# Patient Record
Sex: FEMALE | Race: WHITE | NOT HISPANIC OR LATINO | Employment: UNEMPLOYED | URBAN - METROPOLITAN AREA
[De-identification: names, ages, dates, MRNs, and addresses within clinical notes are randomized per-mention and may not be internally consistent; named-entity substitution may affect disease eponyms.]

---
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
## 2018-04-17 ENCOUNTER — GYNECOLOGY VISIT (OUTPATIENT)
Dept: OBGYN | Facility: MEDICAL CENTER | Age: 18
End: 2018-04-17
Payer: COMMERCIAL

## 2018-04-17 VITALS
WEIGHT: 128 LBS | BODY MASS INDEX: 22.68 KG/M2 | DIASTOLIC BLOOD PRESSURE: 58 MMHG | HEIGHT: 63 IN | SYSTOLIC BLOOD PRESSURE: 100 MMHG

## 2018-04-17 DIAGNOSIS — Z30.46 NEXPLANON REMOVAL: ICD-10-CM

## 2018-04-17 LAB
INT CON NEG: NEGATIVE
INT CON POS: POSITIVE
POC URINE PREGNANCY TEST: NEGATIVE

## 2018-04-17 PROCEDURE — 11982 REMOVE DRUG IMPLANT DEVICE: CPT | Performed by: OBSTETRICS & GYNECOLOGY

## 2018-04-17 PROCEDURE — 81025 URINE PREGNANCY TEST: CPT | Performed by: OBSTETRICS & GYNECOLOGY

## 2018-04-17 NOTE — PROGRESS NOTES
PROCEDURE NOTE    NEXPLANON REMOVAL    Informed consent obtained.  Left arm examined, korey palpated in subdermal location.  Area prepped with Betadine x 3.  2 cc of 1% Lidocaine injected under base of korey.  2 mm skin incision made with the scalpel.  Nexplanon korey grasped with curved hemostat and removed, showed to patient, and discarded.  Steri-strip and pressure dressing applied to wound.    F/U for annual exam.

## 2018-06-26 ENCOUNTER — HOSPITAL ENCOUNTER (OUTPATIENT)
Facility: MEDICAL CENTER | Age: 18
End: 2018-06-26
Attending: NURSE PRACTITIONER
Payer: COMMERCIAL

## 2018-06-26 ENCOUNTER — OFFICE VISIT (OUTPATIENT)
Dept: URGENT CARE | Facility: CLINIC | Age: 18
End: 2018-06-26
Payer: COMMERCIAL

## 2018-06-26 VITALS
HEART RATE: 93 BPM | OXYGEN SATURATION: 97 % | DIASTOLIC BLOOD PRESSURE: 78 MMHG | TEMPERATURE: 99.3 F | HEIGHT: 63 IN | SYSTOLIC BLOOD PRESSURE: 122 MMHG | RESPIRATION RATE: 12 BRPM | BODY MASS INDEX: 22.15 KG/M2 | WEIGHT: 125 LBS

## 2018-06-26 DIAGNOSIS — N12 PYELONEPHRITIS: ICD-10-CM

## 2018-06-26 LAB
APPEARANCE UR: NORMAL
BILIRUB UR STRIP-MCNC: NORMAL MG/DL
COLOR UR AUTO: NORMAL
GLUCOSE UR STRIP.AUTO-MCNC: NORMAL MG/DL
KETONES UR STRIP.AUTO-MCNC: 160 MG/DL
LEUKOCYTE ESTERASE UR QL STRIP.AUTO: NORMAL
NITRITE UR QL STRIP.AUTO: NORMAL
PH UR STRIP.AUTO: 6 [PH] (ref 5–8)
PROT UR QL STRIP: 30 MG/DL
RBC UR QL AUTO: NORMAL
SP GR UR STRIP.AUTO: 1.02
UROBILINOGEN UR STRIP-MCNC: NORMAL MG/DL

## 2018-06-26 PROCEDURE — 81002 URINALYSIS NONAUTO W/O SCOPE: CPT | Performed by: NURSE PRACTITIONER

## 2018-06-26 PROCEDURE — 87086 URINE CULTURE/COLONY COUNT: CPT

## 2018-06-26 PROCEDURE — 99214 OFFICE O/P EST MOD 30 MIN: CPT | Performed by: NURSE PRACTITIONER

## 2018-06-26 RX ORDER — CIPROFLOXACIN 500 MG/1
500 TABLET, FILM COATED ORAL EVERY 12 HOURS
Qty: 14 TAB | Refills: 0 | Status: SHIPPED | OUTPATIENT
Start: 2018-06-26 | End: 2018-07-03

## 2018-06-26 ASSESSMENT — ENCOUNTER SYMPTOMS
SORE THROAT: 0
BOWEL INCONTINENCE: 0
BACK PAIN: 1
VOMITING: 0
CHILLS: 0
DIZZINESS: 0
SHORTNESS OF BREATH: 0
MYALGIAS: 0
FEVER: 0
FLANK PAIN: 0
NAUSEA: 0
EYE PAIN: 0

## 2018-06-26 NOTE — PROGRESS NOTES
"Subjective:   Anais Larsen is a 17 y.o. female who presents for Back Pain (lower back, lower abdominal pain both quardrants, vomitting, diarreah x 1 week )        Back Pain   This is a new problem. The current episode started in the past 7 days. The problem occurs constantly. The problem is unchanged. Pain location: right/left flank. The quality of the pain is described as aching. The pain does not radiate. The pain is at a severity of 4/10. The pain is mild. The pain is the same all the time. Exacerbated by: palpation. Associated symptoms include dysuria. Pertinent negatives include no bladder incontinence, bowel incontinence, chest pain or fever. She has tried nothing for the symptoms. The treatment provided no relief.     Review of Systems   Constitutional: Negative for chills and fever.   HENT: Negative for sore throat.    Eyes: Negative for pain.   Respiratory: Negative for shortness of breath.    Cardiovascular: Negative for chest pain.   Gastrointestinal: Negative for bowel incontinence, nausea and vomiting.   Genitourinary: Positive for dysuria. Negative for bladder incontinence, flank pain, frequency, hematuria and urgency.   Musculoskeletal: Positive for back pain. Negative for myalgias.   Skin: Negative for rash.   Neurological: Negative for dizziness.     Allergies   Allergen Reactions   • Penicillin G Rash     rash   • Sulfa Drugs Swelling      Objective:   /78   Pulse 93   Temp 37.4 °C (99.3 °F)   Resp 12   Ht 1.6 m (5' 3\")   Wt 56.7 kg (125 lb)   LMP 06/23/2018   SpO2 97%   BMI 22.14 kg/m²   Physical Exam   Constitutional: She is oriented to person, place, and time. She appears well-developed and well-nourished. No distress.   HENT:   Head: Normocephalic and atraumatic.   Eyes: Conjunctivae and EOM are normal. Pupils are equal, round, and reactive to light.   Cardiovascular: Normal rate and regular rhythm.    No murmur heard.  Pulmonary/Chest: Effort normal and breath sounds normal. " No respiratory distress.   Abdominal: Soft. She exhibits no distension. There is tenderness in the suprapubic area. There is CVA tenderness (bilaterally).   Neurological: She is alert and oriented to person, place, and time. She has normal reflexes. No sensory deficit.   Skin: Skin is warm and dry.   Psychiatric: She has a normal mood and affect.         Assessment/Plan:   Assessment    1. Pyelonephritis  - POCT Urinalysis  - Urine Culture; Future  - ciprofloxacin (CIPRO) 500 MG Tab; Take 1 Tab by mouth every 12 hours for 7 days.  Dispense: 14 Tab; Refill: 0  - cefTRIAXone (ROCEPHIN) 1 g, lidocaine (XYLOCAINE) 1 % 3.6 mL for IM use; 1 g by Intramuscular route Once.    Pt. Was given ABX therapy today and will change therapy if culture indicates this is necessary. ER precautions given- worsening symptoms, back pain, abd. Pain, or fevers.   Pt. Is to increase fluids, and take the complete duration of the therapy.   Pt. Understands and agrees with the plan.   F/U with PCP in 3-4 days as needed.       Differential diagnosis, natural history, supportive care, and indications for immediate follow-up discussed.

## 2018-06-27 DIAGNOSIS — N12 PYELONEPHRITIS: ICD-10-CM

## 2018-06-29 LAB
BACTERIA UR CULT: NORMAL
SIGNIFICANT IND 70042: NORMAL
SITE SITE: NORMAL
SOURCE SOURCE: NORMAL

## 2018-08-03 ENCOUNTER — HOSPITAL ENCOUNTER (OUTPATIENT)
Dept: LAB | Facility: MEDICAL CENTER | Age: 18
End: 2018-08-03
Attending: STUDENT IN AN ORGANIZED HEALTH CARE EDUCATION/TRAINING PROGRAM
Payer: COMMERCIAL

## 2018-08-03 LAB — B-HCG SERPL-ACNC: 8431 MIU/ML (ref 0–5)

## 2018-08-03 PROCEDURE — 84702 CHORIONIC GONADOTROPIN TEST: CPT

## 2018-08-03 PROCEDURE — 36415 COLL VENOUS BLD VENIPUNCTURE: CPT

## 2018-09-06 ENCOUNTER — HOSPITAL ENCOUNTER (EMERGENCY)
Facility: MEDICAL CENTER | Age: 18
End: 2018-09-06
Attending: EMERGENCY MEDICINE
Payer: COMMERCIAL

## 2018-09-06 ENCOUNTER — GYNECOLOGY VISIT (OUTPATIENT)
Dept: OBGYN | Facility: MEDICAL CENTER | Age: 18
End: 2018-09-06
Payer: COMMERCIAL

## 2018-09-06 ENCOUNTER — HOSPITAL ENCOUNTER (OUTPATIENT)
Dept: LAB | Facility: MEDICAL CENTER | Age: 18
End: 2018-09-06
Attending: OBSTETRICS & GYNECOLOGY
Payer: COMMERCIAL

## 2018-09-06 VITALS
WEIGHT: 125 LBS | RESPIRATION RATE: 17 BRPM | BODY MASS INDEX: 22.15 KG/M2 | HEIGHT: 63 IN | SYSTOLIC BLOOD PRESSURE: 124 MMHG | HEART RATE: 60 BPM | DIASTOLIC BLOOD PRESSURE: 69 MMHG | OXYGEN SATURATION: 98 % | TEMPERATURE: 98.8 F

## 2018-09-06 VITALS
HEIGHT: 63 IN | BODY MASS INDEX: 22.15 KG/M2 | DIASTOLIC BLOOD PRESSURE: 72 MMHG | WEIGHT: 125 LBS | SYSTOLIC BLOOD PRESSURE: 103 MMHG

## 2018-09-06 DIAGNOSIS — O03.9 MISCARRIAGE: ICD-10-CM

## 2018-09-06 DIAGNOSIS — R10.9 ABDOMINAL CRAMPING: ICD-10-CM

## 2018-09-06 DIAGNOSIS — N93.8 DUB (DYSFUNCTIONAL UTERINE BLEEDING): ICD-10-CM

## 2018-09-06 DIAGNOSIS — N93.9 VAGINAL BLEEDING: ICD-10-CM

## 2018-09-06 DIAGNOSIS — O02.1 MISSED AB: ICD-10-CM

## 2018-09-06 LAB
ABO GROUP BLD: NORMAL
ALBUMIN SERPL BCP-MCNC: 4 G/DL (ref 3.2–4.9)
ALBUMIN/GLOB SERPL: 1.3 G/DL
ALP SERPL-CCNC: 70 U/L (ref 30–99)
ALT SERPL-CCNC: 31 U/L (ref 2–50)
ANION GAP SERPL CALC-SCNC: 8 MMOL/L (ref 0–11.9)
APTT PPP: 26.4 SEC (ref 24.7–36)
AST SERPL-CCNC: 26 U/L (ref 12–45)
B-HCG SERPL-ACNC: 6894 MIU/ML (ref 0–10)
BASOPHILS # BLD AUTO: 0.8 % (ref 0–1.8)
BASOPHILS # BLD AUTO: 0.8 % (ref 0–1.8)
BASOPHILS # BLD: 0.08 K/UL (ref 0–0.12)
BASOPHILS # BLD: 0.09 K/UL (ref 0–0.12)
BILIRUB SERPL-MCNC: 0.7 MG/DL (ref 0.1–1.5)
BLD GP AB SCN SERPL QL: NORMAL
BUN SERPL-MCNC: 9 MG/DL (ref 8–22)
CALCIUM SERPL-MCNC: 9.5 MG/DL (ref 8.4–10.2)
CHLORIDE SERPL-SCNC: 105 MMOL/L (ref 96–112)
CO2 SERPL-SCNC: 23 MMOL/L (ref 20–33)
CREAT SERPL-MCNC: 0.73 MG/DL (ref 0.5–1.4)
EOSINOPHIL # BLD AUTO: 0.14 K/UL (ref 0–0.51)
EOSINOPHIL # BLD AUTO: 0.22 K/UL (ref 0–0.51)
EOSINOPHIL NFR BLD: 1.3 % (ref 0–6.9)
EOSINOPHIL NFR BLD: 1.8 % (ref 0–6.9)
ERYTHROCYTE [DISTWIDTH] IN BLOOD BY AUTOMATED COUNT: 44.2 FL (ref 35.9–50)
ERYTHROCYTE [DISTWIDTH] IN BLOOD BY AUTOMATED COUNT: 46.5 FL (ref 35.9–50)
GLOBULIN SER CALC-MCNC: 3 G/DL (ref 1.9–3.5)
GLUCOSE SERPL-MCNC: 113 MG/DL (ref 65–99)
HCT VFR BLD AUTO: 37.7 % (ref 37–47)
HCT VFR BLD AUTO: 42.9 % (ref 37–47)
HGB BLD-MCNC: 13 G/DL (ref 12–16)
HGB BLD-MCNC: 14.3 G/DL (ref 12–16)
IMM GRANULOCYTES # BLD AUTO: 0.03 K/UL (ref 0–0.11)
IMM GRANULOCYTES # BLD AUTO: 0.04 K/UL (ref 0–0.11)
IMM GRANULOCYTES NFR BLD AUTO: 0.3 % (ref 0–0.9)
IMM GRANULOCYTES NFR BLD AUTO: 0.3 % (ref 0–0.9)
IN CLINIC OB SCAN: ABNORMAL
INR PPP: 1.23 (ref 0.87–1.13)
LYMPHOCYTES # BLD AUTO: 2.09 K/UL (ref 1–4.8)
LYMPHOCYTES # BLD AUTO: 2.15 K/UL (ref 1–4.8)
LYMPHOCYTES NFR BLD: 17.9 % (ref 22–41)
LYMPHOCYTES NFR BLD: 20.1 % (ref 22–41)
MCH RBC QN AUTO: 31.6 PG (ref 27–33)
MCH RBC QN AUTO: 31.8 PG (ref 27–33)
MCHC RBC AUTO-ENTMCNC: 33.3 G/DL (ref 33.6–35)
MCHC RBC AUTO-ENTMCNC: 34.5 G/DL (ref 33.6–35)
MCV RBC AUTO: 92.2 FL (ref 81.4–97.8)
MCV RBC AUTO: 94.7 FL (ref 81.4–97.8)
MONOCYTES # BLD AUTO: 0.79 K/UL (ref 0–0.85)
MONOCYTES # BLD AUTO: 0.93 K/UL (ref 0–0.85)
MONOCYTES NFR BLD AUTO: 7.6 % (ref 0–13.4)
MONOCYTES NFR BLD AUTO: 7.8 % (ref 0–13.4)
NEUTROPHILS # BLD AUTO: 7.27 K/UL (ref 2–7.15)
NEUTROPHILS # BLD AUTO: 8.56 K/UL (ref 2–7.15)
NEUTROPHILS NFR BLD: 69.9 % (ref 44–72)
NEUTROPHILS NFR BLD: 71.4 % (ref 44–72)
NRBC # BLD AUTO: 0 K/UL
NRBC # BLD AUTO: 0 K/UL
NRBC BLD-RTO: 0 /100 WBC
NRBC BLD-RTO: 0 /100 WBC
NUMBER OF RH DOSES IND 8505RD: NORMAL
PLATELET # BLD AUTO: 232 K/UL (ref 164–446)
PLATELET # BLD AUTO: 247 K/UL (ref 164–446)
PMV BLD AUTO: 10.3 FL (ref 9–12.9)
PMV BLD AUTO: 10.9 FL (ref 9–12.9)
POTASSIUM SERPL-SCNC: 3.2 MMOL/L (ref 3.6–5.5)
PROT SERPL-MCNC: 7 G/DL (ref 6–8.2)
PROTHROMBIN TIME: 15.4 SEC (ref 12–14.6)
RBC # BLD AUTO: 4.09 M/UL (ref 4.2–5.4)
RBC # BLD AUTO: 4.53 M/UL (ref 4.2–5.4)
RH BLD: NORMAL
RH BLD: NORMAL
SODIUM SERPL-SCNC: 136 MMOL/L (ref 135–145)
WBC # BLD AUTO: 10.4 K/UL (ref 4.8–10.8)
WBC # BLD AUTO: 12 K/UL (ref 4.8–10.8)

## 2018-09-06 PROCEDURE — 86850 RBC ANTIBODY SCREEN: CPT

## 2018-09-06 PROCEDURE — 86901 BLOOD TYPING SEROLOGIC RH(D): CPT

## 2018-09-06 PROCEDURE — 99213 OFFICE O/P EST LOW 20 MIN: CPT | Mod: 25 | Performed by: OBSTETRICS & GYNECOLOGY

## 2018-09-06 PROCEDURE — 80053 COMPREHEN METABOLIC PANEL: CPT

## 2018-09-06 PROCEDURE — 84702 CHORIONIC GONADOTROPIN TEST: CPT

## 2018-09-06 PROCEDURE — 99284 EMERGENCY DEPT VISIT MOD MDM: CPT

## 2018-09-06 PROCEDURE — 86901 BLOOD TYPING SEROLOGIC RH(D): CPT | Mod: 91

## 2018-09-06 PROCEDURE — 86900 BLOOD TYPING SEROLOGIC ABO: CPT

## 2018-09-06 PROCEDURE — 76817 TRANSVAGINAL US OBSTETRIC: CPT | Performed by: OBSTETRICS & GYNECOLOGY

## 2018-09-06 PROCEDURE — 36415 COLL VENOUS BLD VENIPUNCTURE: CPT

## 2018-09-06 PROCEDURE — 85025 COMPLETE CBC W/AUTO DIFF WBC: CPT | Mod: 91

## 2018-09-06 PROCEDURE — 85730 THROMBOPLASTIN TIME PARTIAL: CPT

## 2018-09-06 PROCEDURE — 85610 PROTHROMBIN TIME: CPT

## 2018-09-06 PROCEDURE — 85025 COMPLETE CBC W/AUTO DIFF WBC: CPT

## 2018-09-06 ASSESSMENT — PAIN SCALES - WONG BAKER: WONGBAKER_NUMERICALRESPONSE: HURTS A LITTLE MORE

## 2018-09-06 NOTE — PROGRESS NOTES
"CC: Confirmation of Pregnancy    HPI: Pt is a 18 yo  lmp 18 who presents for evaluation of amenorrhea.  She has had no bleeding since her last menses.  A urine pregnancy test was positive.  She denies vaginal spotting or pain.  She notes nausea and vomiting, but it has improved.    ROS: negative for dizziness, SOB, chest pain, palpitations, dysuria, vaginal discharge.    Blood pressure 103/72, height 1.6 m (5' 3\"), weight 56.7 kg (125 lb), last menstrual period 2018, not currently breastfeeding.    GENERAL: Alert, in no apparent distress  PSYCHIATRIC: Appropriate affect, intact insight and judgement.  ABDOMEN: Soft, nontender, nondistended.  No palpable masses. No hepatosplenomegaly.   No rebound or guarding.  No inguinal lymphadenopathy.  BACK: No CVA tenderness  EXTREMITIES: No edema, no calf tenderness.    GENITOURINARY:  Normal external genitalia, no lesions.  Normal urethral meatus, no masses or tenderness.  Normal bladder without fullness or masses.  Vagina well estrogenized, no vaginal discharge or lesions.  Cervix normal length, nontender.  Uterus normal size, shape, and contour, nontender.  Adnexa nontender, no masses.  Normal anus and perineum.      TRANSVAGINAL ULTRASOUND - performed and interpreted by me    Single gestational sac present.  Yolk sac not visualized    Menendez intrauterine pregnancy with CRL measuring 1.76 cm, c/w 8+2/7 weeks EGA, no cardiac activity noted    No fluid in cul de sac.    Ovaries and cervix appear grossly normal. Cervical length = 3.94 cm.        ASSESSMENT/PLAN:  1. DUB visit - Missed .  Discussed options of expectant management, medical management with cytotec, or suction D&C.  She would like to wait and see if it passes.  Discussed bleeding/pain precautions. Rh factor and CBC obtained.    F/U 2 weeks.   "

## 2018-09-07 ENCOUNTER — HOSPITAL ENCOUNTER (EMERGENCY)
Facility: MEDICAL CENTER | Age: 18
End: 2018-09-07
Attending: EMERGENCY MEDICINE
Payer: COMMERCIAL

## 2018-09-07 ENCOUNTER — APPOINTMENT (OUTPATIENT)
Dept: RADIOLOGY | Facility: MEDICAL CENTER | Age: 18
End: 2018-09-07
Attending: EMERGENCY MEDICINE
Payer: COMMERCIAL

## 2018-09-07 VITALS
RESPIRATION RATE: 18 BRPM | TEMPERATURE: 97.4 F | BODY MASS INDEX: 20.83 KG/M2 | DIASTOLIC BLOOD PRESSURE: 48 MMHG | SYSTOLIC BLOOD PRESSURE: 108 MMHG | HEART RATE: 87 BPM | WEIGHT: 125 LBS | OXYGEN SATURATION: 100 % | HEIGHT: 65 IN

## 2018-09-07 DIAGNOSIS — O03.9 MISCARRIAGE: ICD-10-CM

## 2018-09-07 LAB
B-HCG SERPL-ACNC: 3269.3 MIU/ML (ref 0–5)
BASOPHILS # BLD AUTO: 0.4 % (ref 0–1.8)
BASOPHILS # BLD: 0.05 K/UL (ref 0–0.12)
CYTOLOGY REG CYTOL: NORMAL
EOSINOPHIL # BLD AUTO: 0.12 K/UL (ref 0–0.51)
EOSINOPHIL NFR BLD: 0.9 % (ref 0–6.9)
ERYTHROCYTE [DISTWIDTH] IN BLOOD BY AUTOMATED COUNT: 44.6 FL (ref 35.9–50)
ERYTHROCYTE [DISTWIDTH] IN BLOOD BY AUTOMATED COUNT: 47.2 FL (ref 35.9–50)
HCT VFR BLD AUTO: 27.9 % (ref 37–47)
HCT VFR BLD AUTO: 32 % (ref 37–47)
HGB BLD-MCNC: 11.2 G/DL (ref 12–16)
HGB BLD-MCNC: 9.1 G/DL (ref 12–16)
IMM GRANULOCYTES # BLD AUTO: 0.06 K/UL (ref 0–0.11)
IMM GRANULOCYTES NFR BLD AUTO: 0.4 % (ref 0–0.9)
LYMPHOCYTES # BLD AUTO: 1.76 K/UL (ref 1–4.8)
LYMPHOCYTES NFR BLD: 13.1 % (ref 22–41)
MCH RBC QN AUTO: 31.4 PG (ref 27–33)
MCH RBC QN AUTO: 32.4 PG (ref 27–33)
MCHC RBC AUTO-ENTMCNC: 32.6 G/DL (ref 33.6–35)
MCHC RBC AUTO-ENTMCNC: 35 G/DL (ref 33.6–35)
MCV RBC AUTO: 92.5 FL (ref 81.4–97.8)
MCV RBC AUTO: 96.2 FL (ref 81.4–97.8)
MONOCYTES # BLD AUTO: 0.92 K/UL (ref 0–0.85)
MONOCYTES NFR BLD AUTO: 6.9 % (ref 0–13.4)
NEUTROPHILS # BLD AUTO: 10.5 K/UL (ref 2–7.15)
NEUTROPHILS NFR BLD: 78.3 % (ref 44–72)
NRBC # BLD AUTO: 0 K/UL
NRBC BLD-RTO: 0 /100 WBC
NUMBER OF RH DOSES IND 8505RD: NORMAL
PLATELET # BLD AUTO: 207 K/UL (ref 164–446)
PLATELET # BLD AUTO: 224 K/UL (ref 164–446)
PMV BLD AUTO: 10.3 FL (ref 9–12.9)
PMV BLD AUTO: 10.4 FL (ref 9–12.9)
RBC # BLD AUTO: 2.9 M/UL (ref 4.2–5.4)
RBC # BLD AUTO: 3.46 M/UL (ref 4.2–5.4)
RH BLD: NORMAL
WBC # BLD AUTO: 13.4 K/UL (ref 4.8–10.8)
WBC # BLD AUTO: 17 K/UL (ref 4.8–10.8)

## 2018-09-07 PROCEDURE — 76817 TRANSVAGINAL US OBSTETRIC: CPT

## 2018-09-07 PROCEDURE — 700102 HCHG RX REV CODE 250 W/ 637 OVERRIDE(OP): Performed by: OBSTETRICS & GYNECOLOGY

## 2018-09-07 PROCEDURE — 96375 TX/PRO/DX INJ NEW DRUG ADDON: CPT

## 2018-09-07 PROCEDURE — 96361 HYDRATE IV INFUSION ADD-ON: CPT

## 2018-09-07 PROCEDURE — 700105 HCHG RX REV CODE 258: Performed by: OBSTETRICS & GYNECOLOGY

## 2018-09-07 PROCEDURE — 84702 CHORIONIC GONADOTROPIN TEST: CPT

## 2018-09-07 PROCEDURE — 96376 TX/PRO/DX INJ SAME DRUG ADON: CPT

## 2018-09-07 PROCEDURE — 88305 TISSUE EXAM BY PATHOLOGIST: CPT

## 2018-09-07 PROCEDURE — 99285 EMERGENCY DEPT VISIT HI MDM: CPT

## 2018-09-07 PROCEDURE — 700105 HCHG RX REV CODE 258: Performed by: EMERGENCY MEDICINE

## 2018-09-07 PROCEDURE — 96365 THER/PROPH/DIAG IV INF INIT: CPT

## 2018-09-07 PROCEDURE — 59820 CARE OF MISCARRIAGE: CPT

## 2018-09-07 PROCEDURE — A9270 NON-COVERED ITEM OR SERVICE: HCPCS | Performed by: OBSTETRICS & GYNECOLOGY

## 2018-09-07 PROCEDURE — 700111 HCHG RX REV CODE 636 W/ 250 OVERRIDE (IP): Performed by: EMERGENCY MEDICINE

## 2018-09-07 PROCEDURE — 85025 COMPLETE CBC W/AUTO DIFF WBC: CPT

## 2018-09-07 PROCEDURE — 700111 HCHG RX REV CODE 636 W/ 250 OVERRIDE (IP): Performed by: OBSTETRICS & GYNECOLOGY

## 2018-09-07 PROCEDURE — 700101 HCHG RX REV CODE 250: Performed by: OBSTETRICS & GYNECOLOGY

## 2018-09-07 PROCEDURE — A9270 NON-COVERED ITEM OR SERVICE: HCPCS | Performed by: EMERGENCY MEDICINE

## 2018-09-07 PROCEDURE — 700102 HCHG RX REV CODE 250 W/ 637 OVERRIDE(OP): Performed by: EMERGENCY MEDICINE

## 2018-09-07 PROCEDURE — 86901 BLOOD TYPING SEROLOGIC RH(D): CPT

## 2018-09-07 PROCEDURE — 85027 COMPLETE CBC AUTOMATED: CPT

## 2018-09-07 PROCEDURE — 306637 HCHG MISC ORTHO ITEM RC 0274

## 2018-09-07 RX ORDER — IBUPROFEN 600 MG/1
600 TABLET ORAL EVERY 6 HOURS PRN
Qty: 20 TAB | Refills: 0 | Status: SHIPPED | OUTPATIENT
Start: 2018-09-07 | End: 2019-03-09

## 2018-09-07 RX ORDER — SODIUM CHLORIDE 9 MG/ML
500 INJECTION, SOLUTION INTRAVENOUS ONCE
Status: COMPLETED | OUTPATIENT
Start: 2018-09-07 | End: 2018-09-07

## 2018-09-07 RX ORDER — DOXYCYCLINE HYCLATE 100 MG
100 TABLET ORAL 2 TIMES DAILY
Qty: 8 TAB | Refills: 0 | Status: SHIPPED | OUTPATIENT
Start: 2018-09-07 | End: 2018-09-11

## 2018-09-07 RX ORDER — SODIUM CHLORIDE 9 MG/ML
1000 INJECTION, SOLUTION INTRAVENOUS ONCE
Status: COMPLETED | OUTPATIENT
Start: 2018-09-07 | End: 2018-09-07

## 2018-09-07 RX ORDER — ONDANSETRON 2 MG/ML
4 INJECTION INTRAMUSCULAR; INTRAVENOUS ONCE
Status: COMPLETED | OUTPATIENT
Start: 2018-09-07 | End: 2018-09-07

## 2018-09-07 RX ORDER — IBUPROFEN 600 MG/1
600 TABLET ORAL ONCE
Status: COMPLETED | OUTPATIENT
Start: 2018-09-07 | End: 2018-09-07

## 2018-09-07 RX ORDER — MISOPROSTOL 200 UG/1
200 TABLET ORAL ONCE
Status: COMPLETED | OUTPATIENT
Start: 2018-09-07 | End: 2018-09-07

## 2018-09-07 RX ORDER — MORPHINE SULFATE 4 MG/ML
4 INJECTION, SOLUTION INTRAMUSCULAR; INTRAVENOUS ONCE
Status: COMPLETED | OUTPATIENT
Start: 2018-09-07 | End: 2018-09-07

## 2018-09-07 RX ORDER — ONDANSETRON 4 MG/1
4 TABLET, ORALLY DISINTEGRATING ORAL EVERY 8 HOURS PRN
Qty: 10 TAB | Refills: 0 | Status: SHIPPED | OUTPATIENT
Start: 2018-09-07 | End: 2019-03-09

## 2018-09-07 RX ORDER — HYDROCODONE BITARTRATE AND ACETAMINOPHEN 5; 325 MG/1; MG/1
1-2 TABLET ORAL EVERY 4 HOURS PRN
Qty: 20 TAB | Refills: 0 | Status: SHIPPED | OUTPATIENT
Start: 2018-09-07 | End: 2018-09-10

## 2018-09-07 RX ADMIN — SODIUM CHLORIDE 1000 ML: 9 INJECTION, SOLUTION INTRAVENOUS at 10:16

## 2018-09-07 RX ADMIN — FENTANYL CITRATE 50 MCG: 50 INJECTION INTRAMUSCULAR; INTRAVENOUS at 09:24

## 2018-09-07 RX ADMIN — DOXYCYCLINE 100 MG: 100 INJECTION, POWDER, LYOPHILIZED, FOR SOLUTION INTRAVENOUS at 08:10

## 2018-09-07 RX ADMIN — SODIUM CHLORIDE 500 ML: 9 INJECTION, SOLUTION INTRAVENOUS at 07:01

## 2018-09-07 RX ADMIN — FENTANYL CITRATE 100 MCG: 50 INJECTION INTRAMUSCULAR; INTRAVENOUS at 04:44

## 2018-09-07 RX ADMIN — IBUPROFEN 600 MG: 600 TABLET, FILM COATED ORAL at 10:15

## 2018-09-07 RX ADMIN — ONDANSETRON 4 MG: 2 INJECTION INTRAMUSCULAR; INTRAVENOUS at 04:43

## 2018-09-07 RX ADMIN — ONDANSETRON 4 MG: 2 INJECTION INTRAMUSCULAR; INTRAVENOUS at 07:01

## 2018-09-07 RX ADMIN — FENTANYL CITRATE 50 MCG: 50 INJECTION, SOLUTION INTRAMUSCULAR; INTRAVENOUS at 05:50

## 2018-09-07 RX ADMIN — MISOPROSTOL 200 MCG: 200 TABLET ORAL at 08:20

## 2018-09-07 RX ADMIN — ONDANSETRON 4 MG: 2 INJECTION INTRAMUSCULAR; INTRAVENOUS at 09:23

## 2018-09-07 RX ADMIN — SODIUM CHLORIDE 1000 ML: 9 INJECTION, SOLUTION INTRAVENOUS at 04:41

## 2018-09-07 RX ADMIN — SODIUM CHLORIDE 1000 ML: 9 INJECTION, SOLUTION INTRAVENOUS at 09:25

## 2018-09-07 ASSESSMENT — PAIN SCALES - GENERAL
PAINLEVEL_OUTOF10: 8
PAINLEVEL_OUTOF10: 3

## 2018-09-07 NOTE — OP REPORT
Operative report:    Preoperative diagnosis: Incomplete  at approximately 8 weeks gestation    Postoperative diagnosis: Same      Procedure: Bedside suction curettage      Surgeon: Yury Huertas MD    Anesthesia/pain medication: IV fentanyl    Complications: None    Specimen: Products of conception      Estimated blood loss: Approximately 100 mL of organized clots and blood      Findings: Uterus approximately 9-10 weeks size anteverted mobile and moderately tender.  Cervix was noted to be dilated at about 1-1/2 cm and evaluation there was moderate amount of blood and clots in the vaginal vault.  Active bleeding was noted. there was moderate products of conception on suction        Procedure:  After informed consents were obtained, patient was placed in lithotomy position.  Sterile speculum was placed and cervix was visualized after removal of clots from the vagina.  Anterior lip of the cervix was grasped with a ring forcep and 7 Croatian curved curette was advanced through the cervix to the uterine fundus.  The suction device was activated and the curette was rotated to clear the uterus of the remaining products of conception.  The curette was removed and reinserted and suction was again reactivated with scant amount of clots obtained.  Curette was removed.  No active bleeding was noted.  A ring forcep was removed from the anterior lip of the cervix.  Patient tolerated procedure well.  All sponge and instrument counts are correct ×3.  Patient was given IV doxycycline 100 mg and 1 dose of sublingual Cytotec 200 mcg.  Repeat CBC was ordered.  Precautions and plan of care were reviewed.  Restrictions were discussed.  Patient to follow-up in 1 week in office.

## 2018-09-07 NOTE — ED NOTES
"Patient tolerating PO fluids well, denies pain or nausea at this time.  States \"I'm feeling much better\", denies cramping or vaginal bleeding, VSS .   "

## 2018-09-07 NOTE — ED NOTES
Chief Complaint   Patient presents with   • Vaginal Bleeding     Beginning approx 30 mins ago. Went to OBGYN and was told she had miscarrage.    • Abdominal Cramping     in waves.    Pt was approx 8 weeks pregnant.

## 2018-09-07 NOTE — ED NOTES
Bedside report to EDE Krueger.  Pt cleaned up and provided new gown and blankets.  Large clot collected and pending ERP eval.  Pt states cramping is not as frequent as PTA.

## 2018-09-07 NOTE — ED NOTES
Pt bleeding, pads and warm washcloth provided.  Pt reports pain coming in waves.  PIV placed in RAC, blood drawn and sent to lab.

## 2018-09-07 NOTE — ED PROVIDER NOTES
ED Provider Note    CHIEF COMPLAINT   Chief Complaint   Patient presents with   • Vaginal Bleeding     Beginning approx 30 mins ago. Went to OBGYN and was told she had miscarrage.    • Abdominal Cramping     in waves.        BALDEMAR Larsen is a 17 y.o. female who presents with abdominal cramping and vaginal bleeding.  She was seen by her gynecologist today, bedside ultrasound revealed spontaneous .  At the time, patient offered Cytotec or D and E according to the note, the patient declined.  2 hours after getting home she developed abdominal cramping and bleeding.  Patient presents to our emergency department with ongoing bleeding and cramping.  No loss of consciousness.  No shortness of breath or dizziness.  She denies fever.  She has not noticed passage of tissue.  Rh factor positive.  She denies trauma.  Abdominal cramping in the lower abdomen has improved in the past 60 minutes    REVIEW OF SYSTEMS  Genitourinary vaginal bleeding, pelvic cramping, miscarriage diagnosed today  Constitutional: No fever, no dizziness  Gastrointestinal: Lower abdominal cramping, improving  Musculoskeletal no acute back pain       All other systems are negative.        PAST MEDICAL HISTORY  History reviewed. No pertinent past medical history.    FAMILY HISTORY  Family History   Problem Relation Age of Onset   • Hypertension Father    • Hyperlipidemia Father    • Heart Disease Paternal Grandfather    • Cancer Other         breast Ca       SOCIAL HISTORY  Social History     Social History   • Marital status: Single     Spouse name: N/A   • Number of children: N/A   • Years of education: N/A     Social History Main Topics   • Smoking status: Never Smoker   • Smokeless tobacco: Never Used   • Alcohol use No      Comment: occ   • Drug use: Yes     Types: Inhaled      Comment: marijuana   • Sexual activity: Yes     Partners: Male      Comment: contraception      Other Topics Concern   • Not on file     Social History  "Narrative   • No narrative on file       SURGICAL HISTORY  History reviewed. No pertinent surgical history.    CURRENT MEDICATIONS  No current facility-administered medications on file prior to encounter.      No current outpatient prescriptions on file prior to encounter.       ALLERGIES  Allergies   Allergen Reactions   • Penicillin G Rash     rash   • Sulfa Drugs Swelling       PHYSICAL EXAM  VITAL SIGNS: /69   Pulse 61   Temp 37.1 °C (98.8 °F)   Resp 17   Ht 1.6 m (5' 3\")   Wt 56.7 kg (125 lb)   LMP 06/23/2018   SpO2 97%   BMI 22.14 kg/m²   Constitutional:  Well-nourished, no distress  HENT:  Atraumatic, Bilateral external ears normal, Oropharynx moist  Eyes:  Conjunctiva normal, No discharge.   Cardiovascular: Normal heart rate, Normal rhythm   Pulmonary: Normal breath sounds, No respiratory distress  Abdomen: Soft, nontender, no distention.  Genitourinary: Deferred  Skin: Warm, Dry, No erythema, No rash.   Musculoskeletal: No tenderness, No CVA tenderness.   Neurologic: Strength is symmetric, intact    RADIOLOGY/PROCEDURES  Results for orders placed or performed during the hospital encounter of 09/06/18   CBC WITH DIFFERENTIAL   Result Value Ref Range    WBC 12.0 (H) 4.8 - 10.8 K/uL    RBC 4.09 (L) 4.20 - 5.40 M/uL    Hemoglobin 13.0 12.0 - 16.0 g/dL    Hematocrit 37.7 37.0 - 47.0 %    MCV 92.2 81.4 - 97.8 fL    MCH 31.8 27.0 - 33.0 pg    MCHC 34.5 33.6 - 35.0 g/dL    RDW 44.2 35.9 - 50.0 fL    Platelet Count 232 164 - 446 K/uL    MPV 10.3 9.0 - 12.9 fL    Neutrophils-Polys 71.40 44.00 - 72.00 %    Lymphocytes 17.90 (L) 22.00 - 41.00 %    Monocytes 7.80 0.00 - 13.40 %    Eosinophils 1.80 0.00 - 6.90 %    Basophils 0.80 0.00 - 1.80 %    Immature Granulocytes 0.30 0.00 - 0.90 %    Nucleated RBC 0.00 /100 WBC    Neutrophils (Absolute) 8.56 (H) 2.00 - 7.15 K/uL    Lymphs (Absolute) 2.15 1.00 - 4.80 K/uL    Monos (Absolute) 0.93 (H) 0.00 - 0.85 K/uL    Eos (Absolute) 0.22 0.00 - 0.51 K/uL    Baso " (Absolute) 0.09 0.00 - 0.12 K/uL    Immature Granulocytes (abs) 0.04 0.00 - 0.11 K/uL    NRBC (Absolute) 0.00 K/uL   COMP METABOLIC PANEL   Result Value Ref Range    Sodium 136 135 - 145 mmol/L    Potassium 3.2 (L) 3.6 - 5.5 mmol/L    Chloride 105 96 - 112 mmol/L    Co2 23 20 - 33 mmol/L    Anion Gap 8.0 0.0 - 11.9    Glucose 113 (H) 65 - 99 mg/dL    Bun 9 8 - 22 mg/dL    Creatinine 0.73 0.50 - 1.40 mg/dL    Calcium 9.5 8.4 - 10.2 mg/dL    AST(SGOT) 26 12 - 45 U/L    ALT(SGPT) 31 2 - 50 U/L    Alkaline Phosphatase 70 30 - 99 U/L    Total Bilirubin 0.7 0.1 - 1.5 mg/dL    Albumin 4.0 3.2 - 4.9 g/dL    Total Protein 7.0 6.0 - 8.2 g/dL    Globulin 3.0 1.9 - 3.5 g/dL    A-G Ratio 1.3 g/dL   PROTHROMBIN TIME   Result Value Ref Range    PT 15.4 (H) 12.0 - 14.6 sec    INR 1.23 (H) 0.87 - 1.13   APTT   Result Value Ref Range    APTT 26.4 24.7 - 36.0 sec   RH TYPE FOR RHOGAM FROM E.D.   Result Value Ref Range    Emergency Department Rh Typing POS     Number Of Rh Doses Indicated ZERO    HCG QUANTITATIVE   Result Value Ref Range    Bhcg 6894.0 (H) 0.0 - 10.0 mIU/mL   ESTIMATED GFR   Result Value Ref Range    GFR If African American >60 >60 mL/min/1.73 m 2    GFR If Non African American >60 >60 mL/min/1.73 m 2         COURSE & MEDICAL DECISION MAKING  Pertinent Labs & Imaging studies reviewed. (See chart for details)  Patient with hemoglobin of 13, this is down 1 point from the morning.  Her vital signs are normal.  Patient observed in the emergency department for several hours, during this time bleeding began to improve, abdominal cramping resolved.  Patient stated she felt much better.  Patient is going through spontaneous , I do not find a cause for emergent intervention at this time.  Patient is advised that gynecologist comes to this hospital and that if she were to worsen she should call her gynecologist and/or go to [facility] for reevaluation.  Patient is stable upon discharge    FINAL  IMPRESSION  1. Miscarriage    2. Abdominal cramping    3. Vaginal bleeding            Electronically signed by: Solomon Saldivar, 9/6/2018 8:44 PM

## 2018-09-07 NOTE — ED NOTES
Patient ambulated to bathroom, steady gait, provided pad and wipes and cleaned without assist.  Tolerated well, feels better, MD notified.

## 2018-09-07 NOTE — ED NOTES
Patient cleaned, moderate amount of blood noted on chucks pad, given mesh underwear and pad and warm blankets. Vitals signs remain stable and updated with plan of care.  Waiting CBC results.

## 2018-09-07 NOTE — ED NOTES
Pt c/o severe nausea at this time. Pt attempting to hyperventilate. Instructed pt to slow breathing down. Pt is pale. Will notify ERP.

## 2018-09-07 NOTE — ED NOTES
Patient ambulated around pod, tolerated well, steady gait, vital signs stable, ok for discharge waiting for family to bring pants.

## 2018-09-07 NOTE — ED NOTES
Report received from Cyndy MERA, assumed patient care, patient resting comfortably, vss at this time, repeat H&H due at 0800, will continue to monitor.

## 2018-09-07 NOTE — ED NOTES
"Patient assisted to bedside commode, passed 2 large clots, patient states \"feeling lightheaded and dizzy\", returned to bed with no incident, vital signs rechecked, BP low, IVF initiated, Dr. Butts notified, will come see patient.  "

## 2018-09-07 NOTE — ED PROVIDER NOTES
ED Provider Note    The patient's care was signed over to me at 6 AM.  She was scheduled to have a repeat CBC at 8 AM.  She had presented for evaluation of a miscarriage.  Please see Dr. Chapman's note for details.  GYN had been consulted and had done a suction curettage.  Upon repeat evaluation her hemoglobin has gone from 11-9.1.  I discussed the case with Dr. Hardin who is now on call for GYN.  We went over the case which she was already familiar with.  The patient was having a lot of crampy discomfort still but her bleeding was essentially stopped.  She was somewhat lightheaded with getting up therefore she is given another liter of fluid and treated with Motrin p.o. as well as Zofran.  Upon repeat evaluation the patient states she is greatly improved.  She is not nauseated.  Her pain is greatly subsided.  She is no longer lightheaded.  Her blood pressure is improved.  At this point I think she is fine for discharge home.  I will provide her a prescription for Zofran and Norco.  I reviewed her prescription monitoring report and she will sign a consent for treatment with narcotics.  She already had a prescription waiting for her for Motrin and antibiotics.

## 2018-09-07 NOTE — ED TRIAGE NOTES
Anais Larsen  17 y.o.  female  Chief Complaint   Patient presents with   • Vaginal Bleeding   • Near Syncopal     Present to triage c/o vaginal bleeding since 6 pm. Patient was seen at [facility] earlier for the same. LMP - 2nd week of June. + near syncope. Bleeding and abdominal cramping continues.

## 2018-09-07 NOTE — ED NOTES
Patient c/o increased cramping and pain with vomiting. Dr. Butts at bedside and reevaluated patient.  Orders received and medicated as per order. Call placed to Gyn.

## 2018-09-07 NOTE — ED PROVIDER NOTES
"ED Provider Note    CHIEF COMPLAINT  Chief Complaint   Patient presents with   • Vaginal Bleeding   • Near Syncopal       HPI  Anais Larsen is a 17 y.o. female who presents with vaginal bleeding.  The patient followed up with a gynecologist yesterday for her current pregnancy.  She is found to be approximately 8 weeks and 2 days pregnant but there is no fetal heart tones.  Therefore she was diagnosed with a fetal demise and opted to let this pass naturally.  Subsequently she started having crampy abdominal pain followed by vaginal bleeding.  She went to [facility] emergency department and was hydrated and discharged.  The patient states since that time she has had increased bleeding as well as pain.  She also had a near syncopal episode while standing.  She continues to have heavy bleeding as well as severe cramping suprapubic abdominal pain.  She does not have any nausea or vomiting.  She has not had any difficulty with urination.    REVIEW OF SYSTEMS  See HPI for further details. All other systems are negative.     PAST MEDICAL HISTORY  History reviewed. No pertinent past medical history.    SOCIAL HISTORY  Social History     Social History   • Marital status: Single     Spouse name: N/A   • Number of children: N/A   • Years of education: N/A     Social History Main Topics   • Smoking status: Never Smoker   • Smokeless tobacco: Never Used   • Alcohol use No      Comment: occ   • Drug use: Yes     Types: Inhaled      Comment: marijuana   • Sexual activity: Yes     Partners: Male      Comment: contraception      Other Topics Concern   • Not on file     Social History Narrative   • No narrative on file           PHYSICAL EXAM  VITAL SIGNS: /48   Pulse 82   Temp 36.3 °C (97.4 °F)   Resp (!) 22   Ht 1.651 m (5' 5\")   Wt 56.7 kg (125 lb)   LMP 06/23/2018   SpO2 100%   BMI 20.80 kg/m²   Constitutional: in acute distress, Non-toxic appearance.   HENT: Normocephalic, Atraumatic, tympanic membranes are " intact and nonerythematous bilaterally, Oropharynx moist without exudates or erythema, Nose normal.   Eyes: PERRLA, EOMI, Conjunctiva normal.  Neck: Supple without meningismus  Lymphatic: No lymphadenopathy noted.   Cardiovascular: Normal heart rate, Normal rhythm, No murmurs, No rubs, No gallops.   Thorax & Lungs: Normal breath sounds, No respiratory distress, No wheezing, No chest tenderness.   Abdomen: Suprapubic discomfort to deep palpation, no rebound, no guarding, normal bowel sounds  Skin: Warm, Dry, No erythema, No rash.   Back: No tenderness, No CVA tenderness.   Genitalia: Large amount of blood and clots in the vault.  This was evacuated.  The cervix is open    Extremities: Atraumatic with symmetric distal pulses, No edema, No tenderness, No cyanosis, No clubbing.   Neurologic: Alert & oriented x 3, cranial nerves II through XII are intact, Normal motor function, Normal sensory function, No focal deficits noted.   Psychiatric: Affect normal, Judgment normal, Mood normal.     COURSE & MEDICAL DECISION MAKING  Pertinent Labs & Imaging studies reviewed. (See chart for details)  This a 17-year-old female who presents the emergency department with a miscarriage.  She did have a near syncopal episode prior to arrival and therefore she was hydrated intravenously.  She also received fentanyl for pain control.  I was able to remove a large amount of clot from the vault but no obvious products of conception.  I contacted the gynecologist and he did come into the emergency department and performed suction and cleared out debris and clot from the endocervical canal as well as the vaginal vault.  The patient has had a slight drop in her hemoglobin hematocrit however blood pressure has been stable in the low 100s and she is not tachycardic.  The patient's Rh factor is positive.  The obstetrician gynecologist is going to administer sublingual tocolytics to help the uterus contract to stop the bleeding.  He wants to have  the patient observed for 2 hours with a repeat hemoglobin hematocrit at 8 AM.  If the patient is stable she will be discharged home on doxycycline as per the gynecologist.  The patient will be signed out to my partner for follow-up exam at 8 AM.  At 6 AM the patient is resting much more comfortably and is currently medically stable.    FINAL IMPRESSION  1.  Miscarriage     Electronically signed by: Nash Chapman, 9/7/2018 4:38 AM

## 2018-09-07 NOTE — ED NOTES
Patient discharged home, instructions given with prescriptions, verbalized understanding.  Patient wheeled out of ED as patient is visiting family that is admitted in hospital.   Patient has follow up and verbalized understanding of when to return to ED.

## 2018-09-07 NOTE — ED NOTES
Pt's friend came up to the desk and notified staff that pt was starting to get SOB and feels like they are going to pass out. Notified them that the room is ready and will take them back.

## 2018-09-07 NOTE — ED NOTES
Pt to room 8 via w/c. Agree with triage note. Pt c/o increased vaginal bleeding/ cramping since 1800 last night. Pt reports she was diagnosed with a miscarriage, approx 8 weeks pregnant. Pt reports using 6 pads since 1800.

## 2018-09-07 NOTE — DISCHARGE INSTRUCTIONS
"Miscarriage  A miscarriage is the sudden loss of an unborn baby (fetus) before the 20th week of pregnancy. Most miscarriages happen in the first 3 months of pregnancy. Sometimes, it happens before a woman even knows she is pregnant. A miscarriage is also called a \"spontaneous miscarriage\" or \"early pregnancy loss.\" Having a miscarriage can be an emotional experience. Talk with your caregiver about any questions you may have about miscarrying, the grieving process, and your future pregnancy plans.  What are the causes?  · Problems with the fetal chromosomes that make it impossible for the baby to develop normally. Problems with the baby's genes or chromosomes are most often the result of errors that occur, by chance, as the embryo divides and grows. The problems are not inherited from the parents.  · Infection of the cervix or uterus.  · Hormone problems.  · Problems with the cervix, such as having an incompetent cervix. This is when the tissue in the cervix is not strong enough to hold the pregnancy.  · Problems with the uterus, such as an abnormally shaped uterus, uterine fibroids, or congenital abnormalities.  · Certain medical conditions.  · Smoking, drinking alcohol, or taking illegal drugs.  · Trauma.  Often, the cause of a miscarriage is unknown.  What are the signs or symptoms?  · Vaginal bleeding or spotting, with or without cramps or pain.  · Pain or cramping in the abdomen or lower back.  · Passing fluid, tissue, or blood clots from the vagina.  How is this diagnosed?  Your caregiver will perform a physical exam. You may also have an ultrasound to confirm the miscarriage. Blood or urine tests may also be ordered.  How is this treated?  · Sometimes, treatment is not necessary if you naturally pass all the fetal tissue that was in the uterus. If some of the fetus or placenta remains in the body (incomplete miscarriage), tissue left behind may become infected and must be removed. Usually, a dilation and " curettage (D and C) procedure is performed. During a D and C procedure, the cervix is widened (dilated) and any remaining fetal or placental tissue is gently removed from the uterus.  · Antibiotic medicines are prescribed if there is an infection. Other medicines may be given to reduce the size of the uterus (contract) if there is a lot of bleeding.  · If you have Rh negative blood and your baby was Rh positive, you will need a Rh immunoglobulin shot. This shot will protect any future baby from having Rh blood problems in future pregnancies.  Follow these instructions at home:  · Your caregiver may order bed rest or may allow you to continue light activity. Resume activity as directed by your caregiver.  · Have someone help with home and family responsibilities during this time.  · Keep track of the number of sanitary pads you use each day and how soaked (saturated) they are. Write down this information.  · Do not use tampons. Do not douche or have sexual intercourse until approved by your caregiver.  · Only take over-the-counter or prescription medicines for pain or discomfort as directed by your caregiver.  · Do not take aspirin. Aspirin can cause bleeding.  · Keep all follow-up appointments with your caregiver.  · If you or your partner have problems with grieving, talk to your caregiver or seek counseling to help cope with the pregnancy loss. Allow enough time to grieve before trying to get pregnant again.  Get help right away if:  · You have severe cramps or pain in your back or abdomen.  · You have a fever.  · You pass large blood clots (walnut-sized or larger) or tissue from your vagina. Save any tissue for your caregiver to inspect.  · Your bleeding increases.  · You have a thick, bad-smelling vaginal discharge.  · You become lightheaded, weak, or you faint.  · You have chills.  This information is not intended to replace advice given to you by your health care provider. Make sure you discuss any questions  you have with your health care provider.  Document Released: 06/13/2002 Document Revised: 05/25/2017 Document Reviewed: 02/05/2013  Elsevier Interactive Patient Education © 2017 [employer]

## 2018-09-07 NOTE — H&P
"  OBGYN Consult History and Physical    Anais Diana Larsen is a 17 y.o. female  -Para:     Gestational Age:  ~ 8 wks      HPI: Patient is a 17-year-old  1 para 0 who was seen a couple days ago in the office and was diagnosed with a missed  at 8 weeks and 2 days gestation by ultrasound.  Patient was given options and she had desires expectant management.  Patient presented to the emergency room with heavy bleeding and cramping and pain and was diagnosed with an incomplete  and I was consulted by the ER physician.  At the time of evaluation, patient was comfortable.  She was given pain medication in the ER and reports continuation of bleeding.  I discussed ultrasound findings consistent with incomplete  and I discussed treatment options.  Patient desires to proceed with the option of bedside suction curettage.  I explained patient the procedure, risks, benefits and potential complications.  Patient desires to proceed after explanation.  Informed consents were obtained.    Patient Active Problem List    Diagnosis Date Noted   • Retropharyngeal abscess 2017   • Oral abscess 2017           History:   has no past medical history on file.     has no past surgical history on file.    OB History    Para Term  AB Living   2 0 0 0 0 0   SAB TAB Ectopic Molar Multiple Live Births   0 0 0   0        # Outcome Date GA Lbr Dave/2nd Weight Sex Delivery Anes PTL Lv   2 Current            1                    Medications:  No current facility-administered medications on file prior to encounter.      No current outpatient prescriptions on file prior to encounter.       Allergies:  Penicillin g and Sulfa drugs    ROS:   Neuro: negative    Cardiovascular: negative  Gastro intestinal: positive for cramping  Genitourinary: positive for bleeding/cramping            Physical Exam:  /48   Pulse 82   Temp 36.3 °C (97.4 °F)   Resp (!) 22   Ht 1.651 m (5' 5\")  "  Wt 56.7 kg (125 lb)   LMP 06/23/2018   SpO2 100%   BMI 20.80 kg/m²   Constitutional: healthy-appearing  No JVD: while supine  HEENT: PERRLA  Breast Exam: negative  Cardio: regular rate and rhythm, S1, S2 normal, no murmur, click, rub or gallop  Lung: unlabored respirations, no intercostal retractions or accessory muscle use  Abdomen: abdomen is soft, ND, +BS, MOderate TTP in suprapubic area. no organomegaly or guarding  Extremity: extremities, peripheral pulses and reflexes normal    Pelvic/Cervical Exam: Normal external genitalia urethra and [place]'s glands.  Clots noted at the vaginal introitus.  Moderate amount of blood clots present in the vaginal vault on evaluation.  Clots were removed with ring forceps.  Cervix is visualized and appears dilated.  There was active bleeding noted.    Labs:  Recent Labs      09/06/18   1140  09/06/18   1812  09/07/18   0355   WBC  10.4  12.0*  13.4*   RBC  4.53  4.09*  3.46*   HEMOGLOBIN  14.3  13.0  11.2*   HEMATOCRIT  42.9  37.7  32.0*   MCV  94.7  92.2  92.5   MCH  31.6  31.8  32.4   MCHC  33.3*  34.5  35.0   RDW  46.5  44.2  44.6   PLATELETCT  247  232  224   MPV  10.9  10.3  10.4     Prenatal Results    The patient does not have an associated pregnancy episode and working [employer]. Some results will not display without a pregnancy episode and working [employer].   1st Trimester     Test Value Date Time    ABO O  09/06/18 1144    RH       Antibody       CBC/PLT/DIFF       HGB       Platelets       HGB A1C        1 Hr GCT       3 Hr GTT       Rubella       RPR       Urine Culture       24 Urine Protein        24 Urine Creatinine        HBsAg       Hep CAB        HIV       Gonorrhea       Chlamydia       TSH        Free T4         TB       Pap       SYPHILUS TREP QUAL [id number] [[id number]][             2nd Trimester     Test Value Date Time    HCT       HGB       1 Hr GCT       3 Hr GTT             24-28 Weeks     Test Value Date Time    1 Hr GCT        TSH        Free T4        24 Urine  Protein       24 Urine Creatinine       BUN       Creatinine       GFR       AST       ALT       Uric Acid       LDH             3rd Trimester     Test Value Date Time    HCT       HGB       TSH       Free T4       24 Hr Urine Protein       24 Hr Urine Creatinine       SYPHILUS TREP QUAL             35-37 Weeks     Test Value Date Time    GBS PCR LB       GBS PCR             Genetic Screening     Test Value Date Time    Cystic Fibrosis       AFP Quad       Sickle Cell                   Pelvic US:  HISTORY/REASON FOR EXAM:  Vaginal bleeding    TECHNIQUE/EXAM DESCRIPTION: Real-time OB transvaginal pelvis ultrasound was performed with grey-scale, color and duplex Doppler imaging.    COMPARISON:  None.    FINDINGS:    Uterus measures 8.6 x 5.0 x 4.9 cm. Heterogeneous material is seen in the endometrial canal of the lower uterine segment. Areas of increased Doppler flow within this complex area are seen.    The right ovary measures 3.8 x 1.6 x 1.7 cm. The left ovary measures 4.0 x 2.1 x 1.8 cm. There is 1.6 x 1.1 x 1.1 cm heterogeneous lesion in the right ovary. Doppler flow in the bilateral ovaries is seen.   Impression         1.  No intrauterine pregnancy identified.  2.  Thickened endometrial stripe with complex heterogeneous area in the lower uterine segment, appearance suggests retained products of conception.  3.  Complex right ovarian lesion, appearance suggests corpus luteal cyst         Assessment:  Incomplete  at approximately 8 weeks gestation  Rh+ status  Patient was counseled on treatment options and desires to proceed with bedside suction curettage    Patient Active Problem List    Diagnosis Date Noted   • Retropharyngeal abscess 2017   • Oral abscess 2017       Plan:   We will proceed with bedside suction curettage  Informed consents obtained.    Yury Huertas M.D.

## 2018-09-13 ENCOUNTER — GYNECOLOGY VISIT (OUTPATIENT)
Dept: OBGYN | Facility: CLINIC | Age: 18
End: 2018-09-13
Payer: COMMERCIAL

## 2018-09-13 VITALS
WEIGHT: 126 LBS | HEIGHT: 65 IN | DIASTOLIC BLOOD PRESSURE: 64 MMHG | BODY MASS INDEX: 20.99 KG/M2 | SYSTOLIC BLOOD PRESSURE: 108 MMHG

## 2018-09-13 DIAGNOSIS — O03.4 INCOMPLETE ABORTION: ICD-10-CM

## 2018-09-13 DIAGNOSIS — D50.9 IRON DEFICIENCY ANEMIA, UNSPECIFIED IRON DEFICIENCY ANEMIA TYPE: ICD-10-CM

## 2018-09-13 DIAGNOSIS — Z30.011 ENCOUNTER FOR INITIAL PRESCRIPTION OF CONTRACEPTIVE PILLS: ICD-10-CM

## 2018-09-13 DIAGNOSIS — Z98.890 S/P DILATATION AND CURETTAGE: ICD-10-CM

## 2018-09-13 PROCEDURE — 99213 OFFICE O/P EST LOW 20 MIN: CPT | Mod: 24 | Performed by: OBSTETRICS & GYNECOLOGY

## 2018-09-13 RX ORDER — NORGESTIMATE AND ETHINYL ESTRADIOL 7DAYSX3 LO
1 KIT ORAL DAILY
Qty: 28 TAB | Refills: 11 | Status: SHIPPED | OUTPATIENT
Start: 2018-09-13 | End: 2019-03-09

## 2018-09-13 RX ORDER — FERROUS GLUCONATE 324(37.5)
324 TABLET ORAL 2 TIMES DAILY WITH MEALS
Qty: 60 TAB | Refills: 2 | Status: SHIPPED | OUTPATIENT
Start: 2018-09-13 | End: 2019-03-09

## 2018-09-13 ASSESSMENT — ENCOUNTER SYMPTOMS
CARDIOVASCULAR NEGATIVE: 1
GASTROINTESTINAL NEGATIVE: 1
CONSTITUTIONAL NEGATIVE: 1
RESPIRATORY NEGATIVE: 1
EYES NEGATIVE: 1
FLANK PAIN: 0
MUSCULOSKELETAL NEGATIVE: 1
PSYCHIATRIC NEGATIVE: 1
NEUROLOGICAL NEGATIVE: 1

## 2018-09-13 NOTE — PROGRESS NOTES
"Subjective:      Anais Larsen is a 17 y.o. female who presents for ER f/u Gynecologic Exam            HPI patient is a 17-year-old  1 presents today for follow-up from the emergency room.  Patient was seen 6 days ago with an incomplete  and underwent a bedside suction curettage procedure.  Patient presents today for follow-up.  Patient is doing well except for complaints of some tiredness.  She reports scant vaginal bleeding with rare passage of very small clot.  She denies any pain.  She reports normal bowel and bladder functions.  Patient is no longer taking ibuprofen.  Patient states she did not finish doxycycline doses because she keeps forgetting.  Patient previously used Nexplanon and condoms and states she will like to start OCP for contraception.    Review of Systems   Constitutional: Negative.    HENT: Negative.    Eyes: Negative.    Respiratory: Negative.    Cardiovascular: Negative.    Gastrointestinal: Negative.    Genitourinary: Negative for dysuria, flank pain, frequency, hematuria and urgency.   Musculoskeletal: Negative.    Skin: Negative.    Neurological: Negative.    Endo/Heme/Allergies: Negative.    Psychiatric/Behavioral: Negative.    All other systems reviewed and are negative.         Objective:     /64   Ht 1.651 m (5' 5\")   Wt 57.2 kg (126 lb)   LMP 2018   BMI 20.97 kg/m²      Physical Exam   Constitutional: She is oriented to person, place, and time. She appears well-developed and well-nourished.   HENT:   Head: Normocephalic and atraumatic.   Eyes: Pupils are equal, round, and reactive to light.   Neck: Normal range of motion. Neck supple.   Cardiovascular: Normal rate, regular rhythm, normal heart sounds and intact distal pulses.    Pulmonary/Chest: Effort normal and breath sounds normal. No respiratory distress.   Abdominal: Soft. Bowel sounds are normal. She exhibits no distension.   Genitourinary: Vagina normal and uterus normal. No vaginal discharge " found.   Musculoskeletal: Normal range of motion. She exhibits deformity. She exhibits no edema.   Neurological: She is alert and oriented to person, place, and time. No cranial nerve deficit. Coordination normal.   Skin: Skin is warm and dry.   Psychiatric: She has a normal mood and affect. Her behavior is normal. Judgment and thought content normal.   Nursing note and vitals reviewed.        SURGICAL PATHOLOGY CONSULTATION    FINAL DIAGNOSIS:    A. Products of conception:         Mostly hemorrhage and decidua with rare chorionic villi.                                          Diagnosis performed by:                                      KATHY SIEGEL DO       Assessment/Plan:     1. Incomplete .  Patient underwent bedside suction curettage in the emergency room.  Patient is doing well currently.  There is no sign of infection or retained products of conception.      2. S/P dilatation and curettage      3. Encounter for initial prescription of contraceptive pills  Patient desires to start OCP. patient was counseled on OCP use including risks, benefits potential complications including risk for DVT pulmonary embolism blood clots and stroke.  Side effects and failure rates were reviewed.  Patient agrees with OCP usage after counseling.  Prescription given.  Safe sex and backup contraception reviewed.  - Norgestim-Eth Estrad Triphasic (ORTHO TRI-CYCLEN LO) 0.18/0.215/0.25 MG-25 MCG Tab; Take 1 Tab by mouth every day.  Dispense: 28 Tab; Refill: 11    Precautions discussed  Patient will continue pelvic rest for 1 week then can resume full activities  Patient will start iron supplementation.  Prescription given

## 2019-03-09 ENCOUNTER — HOSPITAL ENCOUNTER (EMERGENCY)
Facility: MEDICAL CENTER | Age: 19
End: 2019-03-09
Attending: EMERGENCY MEDICINE
Payer: COMMERCIAL

## 2019-03-09 VITALS
WEIGHT: 122.8 LBS | HEART RATE: 59 BPM | RESPIRATION RATE: 20 BRPM | SYSTOLIC BLOOD PRESSURE: 114 MMHG | BODY MASS INDEX: 21.76 KG/M2 | DIASTOLIC BLOOD PRESSURE: 70 MMHG | HEIGHT: 63 IN | TEMPERATURE: 98.4 F | OXYGEN SATURATION: 99 %

## 2019-03-09 DIAGNOSIS — Z11.3 SCREENING FOR STDS (SEXUALLY TRANSMITTED DISEASES): ICD-10-CM

## 2019-03-09 DIAGNOSIS — R59.1 LYMPHADENOPATHY: ICD-10-CM

## 2019-03-09 LAB
APPEARANCE UR: CLEAR
BACTERIA #/AREA URNS HPF: ABNORMAL /HPF
BACTERIA GENITAL QL WET PREP: NORMAL
BILIRUB UR QL STRIP.AUTO: NEGATIVE
COLOR UR: YELLOW
EPI CELLS #/AREA URNS HPF: ABNORMAL /HPF
GLUCOSE UR STRIP.AUTO-MCNC: NEGATIVE MG/DL
HCG SERPL QL: NEGATIVE
KETONES UR STRIP.AUTO-MCNC: NEGATIVE MG/DL
LEUKOCYTE ESTERASE UR QL STRIP.AUTO: ABNORMAL
MICRO URNS: ABNORMAL
MUCOUS THREADS #/AREA URNS HPF: ABNORMAL /HPF
NITRITE UR QL STRIP.AUTO: NEGATIVE
PH UR STRIP.AUTO: 8 [PH]
PROT UR QL STRIP: NEGATIVE MG/DL
RBC UR QL AUTO: NEGATIVE
SIGNIFICANT IND 70042: NORMAL
SITE SITE: NORMAL
SOURCE SOURCE: NORMAL
SP GR UR STRIP.AUTO: 1.01
WBC #/AREA URNS HPF: ABNORMAL /HPF

## 2019-03-09 PROCEDURE — 81001 URINALYSIS AUTO W/SCOPE: CPT

## 2019-03-09 PROCEDURE — 87591 N.GONORRHOEAE DNA AMP PROB: CPT

## 2019-03-09 PROCEDURE — 87491 CHLMYD TRACH DNA AMP PROBE: CPT

## 2019-03-09 PROCEDURE — 86780 TREPONEMA PALLIDUM: CPT

## 2019-03-09 PROCEDURE — 99284 EMERGENCY DEPT VISIT MOD MDM: CPT

## 2019-03-09 PROCEDURE — 84703 CHORIONIC GONADOTROPIN ASSAY: CPT

## 2019-03-09 PROCEDURE — 86593 SYPHILIS TEST NON-TREP QUANT: CPT

## 2019-03-09 PROCEDURE — 86592 SYPHILIS TEST NON-TREP QUAL: CPT

## 2019-03-10 LAB
C TRACH DNA SPEC QL NAA+PROBE: NEGATIVE
N GONORRHOEA DNA SPEC QL NAA+PROBE: NEGATIVE
RPR SER QL: REACTIVE
RPR SER-TITR: NORMAL {TITER}
SPECIMEN SOURCE: NORMAL
TREPONEMA PALLIDUM IGG+IGM AB [PRESENCE] IN SERUM OR PLASMA BY IMMUNOASSAY: REACTIVE

## 2019-03-10 NOTE — ED NOTES
ED Positive Culture Follow-up/Notification Note:   Date: 3/10/19    Patient seen in the ED on 3/9/2019 for concern for syphilis.   1. Lymphadenopathy    2. Screening for STDs (sexually transmitted diseases)      There are no discharge medications for this patient.    Allergies: Penicillin g and Sulfa drugs    Vitals:    03/09/19 2045 03/09/19 2100 03/09/19 2139 03/09/19 2159   BP:       Pulse: 74 77 (!) 59    Resp:    20   Temp:    36.9 °C (98.4 °F)   TempSrc:    Temporal   SpO2: 97% 97% 99%    Weight:       Height:           Final cultures:   Results     Procedure Component Value Units Date/Time    WET PREP [[id number]] Collected:  03/09/19 2145    Order Status:  Completed Specimen:  Genital from Vaginal Updated:  03/09/19 2212     Significant Indicator NEG     Source GEN     Site VAGINAL     Wet Prep For Parasites No yeast.  Few motile Trichomonas seen.  Few motile Trichomonas seen.  Moderate WBCs seen.      CHLAMYDIA & GC BY PCR [[id number]] Collected:  03/09/19 2145    Order Status:  Completed Specimen:  Genital from Genital Updated:  03/09/19 2201     Source Endo/Cervical    URINALYSIS,CULTURE IF INDICATED [[id number]]  (Abnormal) Collected:  03/09/19 2006    Order Status:  Completed Specimen:  Urine Updated:  03/09/19 2028     Color Yellow     Character Clear     Specific Gravity 1.015     Ph 8.0     Glucose Negative mg/dL      Ketones Negative mg/dL      Protein Negative mg/dL      Bilirubin Negative     Nitrite Negative     Leukocyte Esterase Small (A)     Occult Blood Negative     Micro Urine Req Microscopic          Plan:   Isolated organism not treated with any prescribed therapy. I discussed culture result and need for treatment with patient. She wants to follow up with her PCP tomorrow (she has an appointment). I also told her I would notify the Health Department and they would likely be contacting her. I recommended she schedule with them to receive testing/treatment for HIV, Hepatitis, etc. as  desired/needed. Patient also had trichomonas on wet prep. I notified her of this and again she wants to follow up with PCP. I recommended notification of partners and abstinence.     I left a message with the Health Department re: the patient.    Molly Cervantes, LindyD

## 2019-03-10 NOTE — ED NOTES
D/c inst reviewed w/ the pt to include the importance of f/u w/ her pcp and the lab results.  The pt verb understanding and denies questions.  The pt amb out of the ED w/o diff.

## 2019-03-10 NOTE — ED PROVIDER NOTES
ED Provider Note    CHIEF COMPLAINT  Chief Complaint   Patient presents with   • Nodule   • Low Back Pain   • Painful Urination       HPI  Anais Larsen is a 18 y.o. female who presents with some vaginal discharge, mild urinary difficulty, swelling the right inguinal area and a lesion to the right side of the perineum.  She reports she developed a sore thought it was perhaps a ingrown hair subsequent to that she developed some swelling in the right inguinal area small nodule this had increased in size painful but seems to be improving at this point.  The patient concerned about the possibility of sexually transmitted disease specifically syphilis because of the sore.  She does not think she is pregnant    REVIEW OF SYSTEMS  See HPI for further details.  Constitutional no fever or chills all other systems are negative.    PAST MEDICAL HISTORY  History reviewed. No pertinent past medical history.    FAMILY HISTORY  Family History   Problem Relation Age of Onset   • Hypertension Father    • Hyperlipidemia Father    • Heart Disease Paternal Grandfather    • Cancer Other         breast Ca       SOCIAL HISTORY  Social History     Social History   • Marital status: Single     Spouse name: N/A   • Number of children: N/A   • Years of education: N/A     Social History Main Topics   • Smoking status: Never Smoker   • Smokeless tobacco: Never Used   • Alcohol use No      Comment: occ   • Drug use: Yes     Types: Inhaled      Comment: marijuana   • Sexual activity: Yes     Partners: Male      Comment: contraception      Other Topics Concern   • Not on file     Social History Narrative   • No narrative on file       SURGICAL HISTORY  History reviewed. No pertinent surgical history.    CURRENT MEDICATIONS  Home Medications     Reviewed by Leon Ronquillo R.N. (Registered Nurse) on 03/09/19 at 1959  Med List Status: Partial   Medication Last Dose Status        Patient Royer Taking any Medications                  "      ALLERGIES  Allergies   Allergen Reactions   • Penicillin G Rash     rash   • Sulfa Drugs Swelling       PHYSICAL EXAM  VITAL SIGNS: /70   Pulse 77   Temp 36.4 °C (97.5 °F) (Temporal)   Resp 18   Ht 1.6 m (5' 3\")   Wt 55.7 kg (122 lb 12.7 oz)   LMP 06/23/2018   SpO2 97%   Breastfeeding? Unknown   BMI 21.75 kg/m²      Constitutional: Well developed, Well nourished, No acute distress, Non-toxic appearance.   H .   Abdomen: Bowel sounds normal, Soft, No tenderness, No masses, No pulsatile masses. No guarding.   Pelvic: Whitish discharge.  There is really not a cervical motion tenderness or other abnormality noted swab sent  Skin: Warm, Dry, No erythema, No rash.   Lymphatic: There is right-sided inguinal adenopathy back: No tenderness, No CVA tenderness.  Extremities: Intact distal pulses, No edema, No tenderness, No cyanosis, No clubbing.   Musculoskeletal: Good range of motion in all major joints. No tenderness to palpation or major deformities, or injuries noted.   Neurologic: Alert & oriented x 3, Normal motor function, Normal sensory function, No focal deficits noted.       We will send a swab for GC and chlamydia.  Wet prep.  Send a VDRL although the patient does have a lesion it is tender and appears likely to have been a small abscess or pimple that has now drained, and seems unlikely to be in a chancre.  Will also check pregnancy test patient is informed of these labs would not be available at this point and she likely does not require treatment unless the tests are positive she will be called concerning those  COURSE & MEDICAL DECISION MAKING  P     FINAL IMPRESSION  1.  Inguinal lymphadenopathy  2.  Screening for STD  3.       Electronically signed by: Solomon Garcia, 3/9/2019 9:25 PM  "

## 2019-03-11 ENCOUNTER — HOSPITAL ENCOUNTER (OUTPATIENT)
Dept: LAB | Facility: MEDICAL CENTER | Age: 19
End: 2019-03-11
Attending: STUDENT IN AN ORGANIZED HEALTH CARE EDUCATION/TRAINING PROGRAM
Payer: COMMERCIAL

## 2019-03-11 LAB
C TRACH DNA SPEC QL NAA+PROBE: NEGATIVE
HBV SURFACE AG SER QL: NEGATIVE
HCV AB SER QL: NEGATIVE
HIV 1+2 AB+HIV1 P24 AG SERPL QL IA: NON REACTIVE
N GONORRHOEA DNA SPEC QL NAA+PROBE: NEGATIVE
RPR SER QL: REACTIVE
RPR SER-TITR: NORMAL {TITER}
SPECIMEN SOURCE: NORMAL
TREPONEMA PALLIDUM IGG+IGM AB [PRESENCE] IN SERUM OR PLASMA BY IMMUNOASSAY: REACTIVE

## 2019-03-11 PROCEDURE — 36415 COLL VENOUS BLD VENIPUNCTURE: CPT

## 2019-03-11 PROCEDURE — 87591 N.GONORRHOEAE DNA AMP PROB: CPT

## 2019-03-11 PROCEDURE — 86780 TREPONEMA PALLIDUM: CPT

## 2019-03-11 PROCEDURE — 86593 SYPHILIS TEST NON-TREP QUANT: CPT

## 2019-03-11 PROCEDURE — 86592 SYPHILIS TEST NON-TREP QUAL: CPT

## 2019-03-11 PROCEDURE — 87529 HSV DNA AMP PROBE: CPT

## 2019-03-11 PROCEDURE — 86803 HEPATITIS C AB TEST: CPT

## 2019-03-11 PROCEDURE — 87340 HEPATITIS B SURFACE AG IA: CPT

## 2019-03-11 PROCEDURE — 87389 HIV-1 AG W/HIV-1&-2 AB AG IA: CPT

## 2019-03-11 PROCEDURE — 86708 HEPATITIS A ANTIBODY: CPT

## 2019-03-11 PROCEDURE — 87491 CHLMYD TRACH DNA AMP PROBE: CPT

## 2019-03-13 LAB — HAV AB SER QL IA: POSITIVE

## 2019-03-13 NOTE — ED NOTES
Left voice message for patient to return my call. I have not yet heard back from DALILA London.  Molly Cervantes, LindyD

## 2019-03-13 NOTE — ED NOTES
I received a call from ED  telling me that the patient called and let them know that she did not make the PCP appointment and would like to be prescribed treatment. They did not know what pharmacy she would like this sent to. However, when reviewing labs it looks as if CECILIA Escamilla ordered several labs on 3/11 (the day her appointment was supposed to be). I called to discuss how these labs were ordered and to ascertain more of the discussion between the patient and PA that lead to ordering of these labs. Unfortunately the patient is allergic to the primary recommended treatment for syphilis and would need treatment with an alternative agent. I am interested in finding out if the PA who ordered the labs is going to be monitoring and providing follow up in the instance of a prescription.  I previously recommended the patient have follow up with the Health Department.  I am awaiting the PA's return call with the possibility that the PA may have not prescribed treatment for a reason and to gain more clarification on the situation.  Molly Cervantes, LindyD

## 2019-03-14 ENCOUNTER — HOSPITAL ENCOUNTER (EMERGENCY)
Facility: MEDICAL CENTER | Age: 19
End: 2019-03-14
Payer: COMMERCIAL

## 2019-03-14 VITALS
HEIGHT: 63 IN | TEMPERATURE: 97.3 F | BODY MASS INDEX: 21.62 KG/M2 | RESPIRATION RATE: 18 BRPM | WEIGHT: 122 LBS | DIASTOLIC BLOOD PRESSURE: 86 MMHG | HEART RATE: 66 BPM | OXYGEN SATURATION: 100 % | SYSTOLIC BLOOD PRESSURE: 115 MMHG

## 2019-03-14 LAB
HSV DNA SPEC QL NAA+PROBE: NOT DETECTED
SPECIMEN SOURCE: NORMAL

## 2019-03-14 PROCEDURE — 302449 STATCHG TRIAGE ONLY (STATISTIC)

## 2019-03-15 ENCOUNTER — HOSPITAL ENCOUNTER (EMERGENCY)
Facility: MEDICAL CENTER | Age: 19
End: 2019-03-15
Attending: EMERGENCY MEDICINE
Payer: COMMERCIAL

## 2019-03-15 VITALS
SYSTOLIC BLOOD PRESSURE: 129 MMHG | HEIGHT: 63 IN | HEART RATE: 81 BPM | RESPIRATION RATE: 18 BRPM | TEMPERATURE: 97.6 F | DIASTOLIC BLOOD PRESSURE: 80 MMHG | OXYGEN SATURATION: 100 % | WEIGHT: 119.49 LBS | BODY MASS INDEX: 21.17 KG/M2

## 2019-03-15 DIAGNOSIS — A53.9 SYPHILIS: ICD-10-CM

## 2019-03-15 PROCEDURE — 700102 HCHG RX REV CODE 250 W/ 637 OVERRIDE(OP): Performed by: EMERGENCY MEDICINE

## 2019-03-15 PROCEDURE — 99284 EMERGENCY DEPT VISIT MOD MDM: CPT

## 2019-03-15 PROCEDURE — A9270 NON-COVERED ITEM OR SERVICE: HCPCS | Performed by: EMERGENCY MEDICINE

## 2019-03-15 RX ORDER — DOXYCYCLINE 100 MG/1
100 CAPSULE ORAL 2 TIMES DAILY
Qty: 28 CAP | Refills: 0 | Status: SHIPPED | OUTPATIENT
Start: 2019-03-15 | End: 2019-03-29

## 2019-03-15 RX ORDER — DOXYCYCLINE 100 MG/1
100 TABLET ORAL ONCE
Status: COMPLETED | OUTPATIENT
Start: 2019-03-15 | End: 2019-03-15

## 2019-03-15 RX ADMIN — DOXYCYCLINE 100 MG: 100 TABLET, FILM COATED ORAL at 23:34

## 2019-03-16 NOTE — ED PROVIDER NOTES
"ED Provider Note    CHIEF COMPLAINT  Chief Complaint   Patient presents with   • Sent by MD   • Exposure to STD       HPI  Anais Larsen is a 18 y.o. female who presents for treatment of syphilis.  She was here a few days ago and tested for STIs including RPR and syphilis.  She was called by the health department and told to come to the ED for treatment. She has already informed her other partners of her diagnosis.  She is allergic to pcn so alternative treatments will be needed.  She had a rash to pcn a few months ago.    REVIEW OF SYSTEMS  See HPI for further details.     ALLERGIES  Allergies   Allergen Reactions   • Penicillin G Rash     rash   • Sulfa Drugs Swelling       CURRENT MEDICATIONS  Home Medications     Reviewed by Leon Ronquillo R.N. (Registered Nurse) on 03/15/19 at 2215  Med List Status: Partial   Medication Last Dose Status        Patient Royer Taking any Medications                       PAST MEDICAL HISTORY  History reviewed. No pertinent past medical history.    SURGICAL HISTORY  History reviewed. No pertinent surgical history.    SOCIAL HISTORY  Social History     Social History   • Marital status: Single     Spouse name: N/A   • Number of children: N/A   • Years of education: N/A     Social History Main Topics   • Smoking status: Never Smoker   • Smokeless tobacco: Never Used   • Alcohol use Yes   • Drug use: Yes     Types: Inhaled      Comment: marijuana   • Sexual activity: Yes     Partners: Male      Comment: contraception      Other Topics Concern   • Not on file     Social History Narrative   • No narrative on file       FAMILY HISTORY  Family History   Problem Relation Age of Onset   • Hypertension Father    • Hyperlipidemia Father    • Heart Disease Paternal Grandfather    • Cancer Other         breast Ca       VITAL SIGNS: /80   Pulse 89   Temp 36.4 °C (97.6 °F) (Temporal)   Resp 18   Ht 1.6 m (5' 3\")   Wt 54.2 kg (119 lb 7.8 oz)   SpO2 98%   BMI 21.17 kg/m² "     PHYSICAL EXAM:    Constitutional: Well-nourished well-developed in no apparent distress and nontoxic on exam.   HENT: Normocephalic atraumatic, no nasal discharge or epistaxis, mucous membranes are moist   Eyes: Sclerae anicteric and no periorbital edema  Neck: No JVD or goiter was noted.  Cardiovascular: Regular rate and rhythm without S3-S4 or murmur.  Thorax & Lungs: Clear to auscultation bilaterally without rales rhonchi or wheezing.    ER COURSE & MEDICAL DECISION MAKING    Because of pts pcn allergy Bicillin inj would not be recommended.  Alternative tx is doxy for 14 days.  She is not pregnant.  Needs to follow up with PCP for further testing and repeat testing.      Previous ED records reviewed.  Neg HCG, pos RPR and syphilis, pos Hep A    FINAL IMPRESSION  1. syphilis  2.   3.   Critical care time: none    Electronically signed by: Julio Cesar Durham, 3/15/2019 11:04 PM

## 2019-03-27 ENCOUNTER — HOSPITAL ENCOUNTER (OUTPATIENT)
Dept: LAB | Facility: MEDICAL CENTER | Age: 19
End: 2019-03-27
Attending: STUDENT IN AN ORGANIZED HEALTH CARE EDUCATION/TRAINING PROGRAM
Payer: COMMERCIAL

## 2019-03-27 LAB
ALBUMIN SERPL BCP-MCNC: 4.2 G/DL (ref 3.2–4.9)
ALBUMIN/GLOB SERPL: 1.4 G/DL
ALP SERPL-CCNC: 85 U/L (ref 30–99)
ALT SERPL-CCNC: 24 U/L (ref 2–50)
ANION GAP SERPL CALC-SCNC: 9 MMOL/L (ref 0–11.9)
AST SERPL-CCNC: 19 U/L (ref 12–45)
BILIRUB SERPL-MCNC: 0.2 MG/DL (ref 0.1–1.5)
BUN SERPL-MCNC: 18 MG/DL (ref 8–22)
CALCIUM SERPL-MCNC: 9.5 MG/DL (ref 8.5–10.5)
CHLORIDE SERPL-SCNC: 105 MMOL/L (ref 96–112)
CO2 SERPL-SCNC: 25 MMOL/L (ref 20–33)
CREAT SERPL-MCNC: 0.75 MG/DL (ref 0.5–1.4)
GLOBULIN SER CALC-MCNC: 3 G/DL (ref 1.9–3.5)
GLUCOSE SERPL-MCNC: 71 MG/DL (ref 65–99)
POTASSIUM SERPL-SCNC: 4.4 MMOL/L (ref 3.6–5.5)
PROT SERPL-MCNC: 7.2 G/DL (ref 6–8.2)
SODIUM SERPL-SCNC: 139 MMOL/L (ref 135–145)

## 2019-03-27 PROCEDURE — 86709 HEPATITIS A IGM ANTIBODY: CPT

## 2019-03-27 PROCEDURE — 36415 COLL VENOUS BLD VENIPUNCTURE: CPT

## 2019-03-27 PROCEDURE — 80053 COMPREHEN METABOLIC PANEL: CPT

## 2019-03-27 PROCEDURE — 86708 HEPATITIS A ANTIBODY: CPT

## 2019-03-29 LAB
HAV AB SER QL IA: POSITIVE
HAV IGM SERPL QL IA: NEGATIVE

## 2019-06-24 NOTE — ED NOTES
Discharge Note:    Patient denies suicidal / homicidal intent or plan. Patient denies auditory / visual hallucinations. Patient mood stable and calm. Appropriate affect with good eye contact. Patient alert and oriented to all spheres. Patient denies pain at this time and is observed to be in no current distress at time of discharge. Patient agrees to seek emergency treatment before acting on dangerous impulses.      Discharge instructions and medications reviewed with patient.  Home medications returned to patient.  Prescriptions escribed to preferred pharmacy at time of discharge. Treatment plans closed out with objectives met. All belonging returned to patient.  Patient leaving the unit ambulatory by self.    Education on heat precautions while on psychiatric medications provided.             Med rec complete per Pt at bedside  Allergies reviewed  Pt is currently on a 10 day course of Augmentin  Course started on 10/7

## 2019-07-29 ENCOUNTER — HOSPITAL ENCOUNTER (EMERGENCY)
Facility: MEDICAL CENTER | Age: 19
End: 2019-07-29
Attending: EMERGENCY MEDICINE
Payer: COMMERCIAL

## 2019-07-29 VITALS
SYSTOLIC BLOOD PRESSURE: 132 MMHG | RESPIRATION RATE: 16 BRPM | TEMPERATURE: 98.3 F | WEIGHT: 126.54 LBS | HEART RATE: 58 BPM | HEIGHT: 63 IN | OXYGEN SATURATION: 100 % | BODY MASS INDEX: 22.42 KG/M2 | DIASTOLIC BLOOD PRESSURE: 80 MMHG

## 2019-07-29 DIAGNOSIS — A59.01 TRICHOMONAS VAGINALIS (TV) INFECTION: ICD-10-CM

## 2019-07-29 DIAGNOSIS — R10.2 PELVIC PAIN: ICD-10-CM

## 2019-07-29 LAB
ALBUMIN SERPL BCP-MCNC: 4.7 G/DL (ref 3.2–4.9)
ALBUMIN/GLOB SERPL: 1.5 G/DL
ALP SERPL-CCNC: 98 U/L (ref 30–99)
ALT SERPL-CCNC: 9 U/L (ref 2–50)
ANION GAP SERPL CALC-SCNC: 7 MMOL/L (ref 0–11.9)
APPEARANCE UR: CLEAR
AST SERPL-CCNC: 15 U/L (ref 12–45)
BACTERIA #/AREA URNS HPF: ABNORMAL /HPF
BACTERIA GENITAL QL WET PREP: NORMAL
BASOPHILS # BLD AUTO: 0.9 % (ref 0–1.8)
BASOPHILS # BLD: 0.09 K/UL (ref 0–0.12)
BILIRUB SERPL-MCNC: 0.3 MG/DL (ref 0.1–1.5)
BILIRUB UR QL STRIP.AUTO: NEGATIVE
BUN SERPL-MCNC: 16 MG/DL (ref 8–22)
CALCIUM SERPL-MCNC: 9.3 MG/DL (ref 8.5–10.5)
CHLORIDE SERPL-SCNC: 106 MMOL/L (ref 96–112)
CO2 SERPL-SCNC: 26 MMOL/L (ref 20–33)
COLOR UR: YELLOW
CREAT SERPL-MCNC: 0.78 MG/DL (ref 0.5–1.4)
EOSINOPHIL # BLD AUTO: 0.31 K/UL (ref 0–0.51)
EOSINOPHIL NFR BLD: 3 % (ref 0–6.9)
EPI CELLS #/AREA URNS HPF: ABNORMAL /HPF
ERYTHROCYTE [DISTWIDTH] IN BLOOD BY AUTOMATED COUNT: 52.7 FL (ref 35.9–50)
GLOBULIN SER CALC-MCNC: 3.2 G/DL (ref 1.9–3.5)
GLUCOSE SERPL-MCNC: 92 MG/DL (ref 65–99)
GLUCOSE UR STRIP.AUTO-MCNC: NEGATIVE MG/DL
HCG SERPL QL: NEGATIVE
HCT VFR BLD AUTO: 45.8 % (ref 37–47)
HGB BLD-MCNC: 14.7 G/DL (ref 12–16)
HYALINE CASTS #/AREA URNS LPF: ABNORMAL /LPF
IMM GRANULOCYTES # BLD AUTO: 0.03 K/UL (ref 0–0.11)
IMM GRANULOCYTES NFR BLD AUTO: 0.3 % (ref 0–0.9)
KETONES UR STRIP.AUTO-MCNC: NEGATIVE MG/DL
LEUKOCYTE ESTERASE UR QL STRIP.AUTO: ABNORMAL
LYMPHOCYTES # BLD AUTO: 3.9 K/UL (ref 1–4.8)
LYMPHOCYTES NFR BLD: 37.5 % (ref 22–41)
MCH RBC QN AUTO: 29.3 PG (ref 27–33)
MCHC RBC AUTO-ENTMCNC: 32.1 G/DL (ref 33.6–35)
MCV RBC AUTO: 91.2 FL (ref 81.4–97.8)
MICRO URNS: ABNORMAL
MONOCYTES # BLD AUTO: 0.69 K/UL (ref 0–0.85)
MONOCYTES NFR BLD AUTO: 6.6 % (ref 0–13.4)
NEUTROPHILS # BLD AUTO: 5.37 K/UL (ref 2–7.15)
NEUTROPHILS NFR BLD: 51.7 % (ref 44–72)
NITRITE UR QL STRIP.AUTO: NEGATIVE
NRBC # BLD AUTO: 0 K/UL
NRBC BLD-RTO: 0 /100 WBC
PH UR STRIP.AUTO: 5.5 [PH] (ref 5–8)
PLATELET # BLD AUTO: 232 K/UL (ref 164–446)
PMV BLD AUTO: 10.6 FL (ref 9–12.9)
POTASSIUM SERPL-SCNC: 3.8 MMOL/L (ref 3.6–5.5)
PROT SERPL-MCNC: 7.9 G/DL (ref 6–8.2)
PROT UR QL STRIP: NEGATIVE MG/DL
RBC # BLD AUTO: 5.02 M/UL (ref 4.2–5.4)
RBC # URNS HPF: ABNORMAL /HPF
RBC UR QL AUTO: NEGATIVE
SIGNIFICANT IND 70042: NORMAL
SITE SITE: NORMAL
SODIUM SERPL-SCNC: 139 MMOL/L (ref 135–145)
SOURCE SOURCE: NORMAL
SP GR UR STRIP.AUTO: 1.02
UROBILINOGEN UR STRIP.AUTO-MCNC: 0.2 MG/DL
WBC # BLD AUTO: 10.4 K/UL (ref 4.8–10.8)
WBC #/AREA URNS HPF: ABNORMAL /HPF

## 2019-07-29 PROCEDURE — 81001 URINALYSIS AUTO W/SCOPE: CPT

## 2019-07-29 PROCEDURE — 700101 HCHG RX REV CODE 250: Performed by: EMERGENCY MEDICINE

## 2019-07-29 PROCEDURE — 96372 THER/PROPH/DIAG INJ SC/IM: CPT

## 2019-07-29 PROCEDURE — 87591 N.GONORRHOEAE DNA AMP PROB: CPT

## 2019-07-29 PROCEDURE — 84703 CHORIONIC GONADOTROPIN ASSAY: CPT

## 2019-07-29 PROCEDURE — 80053 COMPREHEN METABOLIC PANEL: CPT

## 2019-07-29 PROCEDURE — 700111 HCHG RX REV CODE 636 W/ 250 OVERRIDE (IP): Performed by: EMERGENCY MEDICINE

## 2019-07-29 PROCEDURE — 87491 CHLMYD TRACH DNA AMP PROBE: CPT

## 2019-07-29 PROCEDURE — 85025 COMPLETE CBC W/AUTO DIFF WBC: CPT

## 2019-07-29 PROCEDURE — 87086 URINE CULTURE/COLONY COUNT: CPT

## 2019-07-29 PROCEDURE — A9270 NON-COVERED ITEM OR SERVICE: HCPCS | Performed by: EMERGENCY MEDICINE

## 2019-07-29 PROCEDURE — 99284 EMERGENCY DEPT VISIT MOD MDM: CPT

## 2019-07-29 PROCEDURE — 700102 HCHG RX REV CODE 250 W/ 637 OVERRIDE(OP): Performed by: EMERGENCY MEDICINE

## 2019-07-29 RX ORDER — METRONIDAZOLE 500 MG/1
2000 TABLET ORAL ONCE
Status: COMPLETED | OUTPATIENT
Start: 2019-07-29 | End: 2019-07-29

## 2019-07-29 RX ORDER — CEFTRIAXONE SODIUM 250 MG/1
250 INJECTION, POWDER, FOR SOLUTION INTRAMUSCULAR; INTRAVENOUS ONCE
Status: COMPLETED | OUTPATIENT
Start: 2019-07-29 | End: 2019-07-29

## 2019-07-29 RX ORDER — AZITHROMYCIN 250 MG/1
1000 TABLET, FILM COATED ORAL ONCE
Status: COMPLETED | OUTPATIENT
Start: 2019-07-29 | End: 2019-07-29

## 2019-07-29 RX ORDER — ONDANSETRON 4 MG/1
4 TABLET, ORALLY DISINTEGRATING ORAL ONCE
Status: COMPLETED | OUTPATIENT
Start: 2019-07-29 | End: 2019-07-29

## 2019-07-29 RX ADMIN — CEFTRIAXONE SODIUM 250 MG: 250 INJECTION, POWDER, FOR SOLUTION INTRAMUSCULAR; INTRAVENOUS at 04:38

## 2019-07-29 RX ADMIN — LIDOCAINE HYDROCHLORIDE 0.9 ML: 10 INJECTION, SOLUTION INFILTRATION; PERINEURAL at 04:45

## 2019-07-29 RX ADMIN — METRONIDAZOLE 2000 MG: 500 TABLET, FILM COATED ORAL at 04:38

## 2019-07-29 RX ADMIN — ONDANSETRON 4 MG: 4 TABLET, ORALLY DISINTEGRATING ORAL at 04:37

## 2019-07-29 RX ADMIN — AZITHROMYCIN 1000 MG: 250 TABLET, FILM COATED ORAL at 04:37

## 2019-07-29 NOTE — DISCHARGE INSTRUCTIONS
Please keep your scheduled follow-up appointment with your gynecologist next month.  Please call your gynecologist tomorrow to schedule an earlier appointment for emergency department follow-up in 1 week to verify improvement as expected.  Alternatively, please call the [facility] center for a follow-up gynecology appointment if you are unable to follow-up with your gynecologist.  Return to the emergency department immediately if you develop any new or worsening symptoms including worsening pain, pain with walking, fevers, nausea or vomiting, or any further concerns.  Please return to the emergency department in 1 week if your symptoms have not resolved and you are not able to follow-up with gynecology.

## 2019-07-29 NOTE — ED NOTES
Pt discharged home. Assessment complete. Pt ambulates self with steady gait. VS stable. Pt verbalized understanding discharge instructions.  pt verbalizes teaching provided.

## 2019-07-29 NOTE — ED PROVIDER NOTES
"ED Provider Note    Chief Complaint:   Vaginal bleeding, dyspareunia, low back pain    HPI:  Anais Larsen is a 18 y.o. female who presents with chief complaint of dyspareunia and low back pain for 1 year.  Additionally she reports vaginal bleeding and spotting for the past 10 months.  She reports a past medical history significant for miscarriage in September, states since that time she has had persistent vaginal spotting intermittently worsening then improving without intervention.  She reports that her low back pain and dyspareunia been present for the past year.  She does have a follow-up appointment with her gynecologist in 1 month, but states she got tired of waiting so she came to the emergency department for further evaluation.  She denies dysuria.  No exacerbating or alleviating factors identified.    Review of Systems:  See HPI for pertinent positives and negatives. All other systems negative.    Past Medical History:   has a past medical history of Miscarriage and Syphilis.    Social History:  Social History     Social History Main Topics   • Smoking status: Never Smoker   • Smokeless tobacco: Never Used   • Alcohol use Yes      Comment: occasional   • Drug use: Yes     Types: Inhaled      Comment: marijuana daily   • Sexual activity: Yes     Partners: Male      Comment: contraception        Surgical History:  patient denies any surgical history    Current Medications:  Home Medications     Reviewed by Celina Amado R.N. (Registered Nurse) on 07/29/19 at 0112  Med List Status: Complete   Medication Last Dose Status        Patient Royer Taking any Medications                       Allergies:  Allergies   Allergen Reactions   • Penicillin G Rash     rash   • Sulfa Drugs Swelling       Physical Exam:  Vital Signs: /85   Pulse 60   Temp 36.8 °C (98.3 °F) (Temporal)   Resp 16   Ht 1.6 m (5' 3\")   Wt 57.4 kg (126 lb 8.7 oz)   LMP  (LMP Unknown)   SpO2 100%   BMI 22.42 kg/m²   Constitutional: " Alert, no acute distress  HENT: Moist mucus membranes, normal posterior pharynx, no intraoral lesions  Eyes: Pupils equal and reactive, normal conjunctiva  Neck: Supple, normal range of motion, no stridor  Cardiovascular: Extremities are warm and well perfused, no murmur appreciated, normal cardiac auscultation  Pulmonary: No respiratory distress, normal work of breathing, no accessory muscule usage, breath sounds clear and equal bilaterally  Abdomen: Soft, non-distended, non-tender to palpation, no peritoneal signs, no right lower quadrant tenderness to palpation  : Normal-appearing external genitalia, cervix is normal-appearing, no cervical motion tenderness, scant yellowish discharge from cervical office, no maricarmen purulent discharge.  Skin: Warm, dry, no rashes or lesions  Musculoskeletal: Normal range of motion in all extremities, no swelling or deformity noted  Neurologic: Alert, oriented, normal speech, normal motor function  Psychiatric: Normal and appropriate mood and affect    Medical records reviewed for continuity of care.  Patient was seen in this emergency department 3/15/2019 for treatment of syphilis.  She was seen in the emergency department previously and tested for sexually transmitted infections including RPR and syphilis.  She was contacted by the health department told to present to the ER for syphilis treatment.  Noted to be penicillin allergic.  Treated with doxycycline for 14 days.  Referred to primary care for follow-up and further testing.  RPR and treponemal testing noted to be reactive 3/9/2019 as well as 3/11/2019.    Labs:  Labs Reviewed   CBC WITH DIFFERENTIAL - Abnormal; Notable for the following:        Result Value    MCHC 32.1 (*)     RDW 52.7 (*)     All other components within normal limits   URINALYSIS,CULTURE IF INDICATED - Abnormal; Notable for the following:     Leukocyte Esterase Small (*)     All other components within normal limits   URINE MICROSCOPIC (W/UA) - Abnormal;  Notable for the following:     WBC 20-50 (*)     Bacteria Few (*)     All other components within normal limits   HCG QUAL SERUM   COMP METABOLIC PANEL   WET PREP   CHLAMYDIA/GC PCR URINE OR SWAB   URINE CULTURE(NEW)   ESTIMATED GFR       Radiology:  No orders to display      ED Medications Administered:  Medications   ondansetron (ZOFRAN ODT) dispertab 4 mg (not administered)   metroNIDAZOLE (FLAGYL) tablet 2,000 mg (not administered)   azithromycin (ZITHROMAX) tablet 1,000 mg (not administered)   cefTRIAXone (ROCEPHIN) injection 250 mg (not administered)   lidocaine (XYLOCAINE) 1 % injection 0.9-2.1 mL (not administered)       Differential diagnosis:  Pregnancy, ectopic pregnancy, dysfunctional uterine bleeding, cystitis, pyelonephritis, pelvic inflammatory disease    MDM:  This is a very well-appearing patient who presents with 1 year of low back pain and pain with intercourse, as well as 10 months of intermittent vaginal bleeding and spotting.  She denies abnormal vaginal discharge, denies fevers.  No significant purulent drainage, no peritoneal signs, doubt pelvic inflammatory disease.    On laboratory evaluation CMP is entirely within normal limits.  hCG is negative.  Urinalysis notable for 20-50 white blood cells, and few bacteria.  She has a normal white blood count.  Wet prep is positive for moderate white blood cells and few modal trichomonas.  Urine culture pending at this time.  GC chlamydia pending at this time.  I discussed empiric treatment for gonorrhea and chlamydia, patient requests treatment at this time.  In addition to 2 g of metronidazole for treatment of trichomonas, she also received 1 g of azithromycin and 250 mg Rocephin for GC chlamydia empiric treatment.    Plan at this time is for discharge home.  She has a scheduled follow-up appointment with her gynecologist in 1 month, counseled to call to see if she can move this to a earlier follow-up appointment. Return precautions were discussed  with the patient, and provided in written form with the patient's discharge instructions.     Blood pressure today is greater than 120/80, patient is instructed to follow up with primary care provider for blood pressure recheck.    Disposition:  Discharge home in stable condition    Final Impression:  1. Pelvic pain    2. Trichomonas vaginalis (TV) infection      Electronically signed by: Park Larsen, 7/29/2019 4:33 AM

## 2019-07-29 NOTE — ED TRIAGE NOTES
Anais Larsen  18 y.o.  Chief Complaint   Patient presents with   • Dyspareunia     vaginal pain during intercourse x 1 year   • Vaginal Bleeding     constant bleeding/spotting bright red blood wiht occasional small clots   • Pregnancy Test     LMP unknown, has been having unprotected intercourse   • Suprapubic Pain     cramping radiating to low back, rates pain 5/10       Ambulatory to triage with steady gait for above.    Was treated for syphilis last year - states that all follow-ups have been normal.    Hx. Miscarriage 9/2018    Triage process explained to patient, apologized for wait time, and returned to lobby.

## 2019-07-29 NOTE — LETTER
8/1/2019               Anais Larsen  [facility] [address] [place] [address]        Dear Anais (MR#[id number])    As we have been unable to contact you by phone, this letter is sent in regards to your, recent visit to the [facility] Emergency Department on 7/29/2019.  During the visit, tests were performed to assist the physician in a medical diagnosis.  A review of those tests requires that we notify you of the following:    Your culture test was positive for Chlamydia, a sexually transmitted infection. This was already treated appropriately in the Emergency Department. .       Based on the above findings it is recommended that you seek testing for the presence of additional sexually transmitted infections from the Health Department. Also, it is advised that you inform your sexual partner(s) within the previous 60 days of the above findings and direct them to the Health Department for testing. Should your symptoms progress, it is important that you follow up with your primary care physician, your local urgent care office, or return to the emergency department for further work up in order to prevent long term health issues.      Thank you for your cooperation in the matter.    Sincerely,  ED Culture Follow-Up Staff  Gianfranco Livingston, PharmD    [facility], Emergency Department  [address], [place] [address]  [phone] (ED Culture Line)  [phone]

## 2019-07-31 LAB
BACTERIA UR CULT: NORMAL
C TRACH DNA SPEC QL NAA+PROBE: POSITIVE
N GONORRHOEA DNA SPEC QL NAA+PROBE: NEGATIVE
SIGNIFICANT IND 70042: NORMAL
SITE SITE: NORMAL
SOURCE SOURCE: NORMAL
SPECIMEN SOURCE: ABNORMAL

## 2019-08-01 NOTE — ED NOTES
"ED Positive Culture Follow-up/Notification Note:    Date: 8/1/19     Patient seen in the ED on 7/29/2019 for vaginal bleeding, dyspareunia, and low back pain. Pt reported dyspareunia and low back pain for 1 year, and vaginal bleeding and spotting for past 10 months. Pt had reported a miscarriage in September, and was seen in ED on 3/15/19 for syphilis treatment. Pt denied dysuria.    1. Pelvic pain    2. Trichomonas vaginalis (TV) infection       There are no discharge medications for this patient.    Medications given in ED:  Azithromycin 1000mg PO once  Ceftriaxone 250mg IM once  Metronidazole 2000mg PO once  Zofran ODT 4mg PO once      Allergies: Penicillin g and Sulfa drugs     Vitals:    07/29/19 0100 07/29/19 0107 07/29/19 0444   BP: 145/85  132/80   Pulse: 60  (!) 58   Resp: 16  16   Temp: 36.8 °C (98.3 °F)     TempSrc: *RETIRED* Temporal     SpO2: 100%     Weight:  57.4 kg (126 lb 8.7 oz)    Height: 1.6 m (5' 3\")         Final cultures:   Results     Procedure Component Value Units Date/Time    URINALYSIS CULTURE, IF INDICATED [[id number]]  (Abnormal) Collected:  07/29/19 0215    Order Status:  Completed Specimen:  Blood Updated:  07/31/19 2118     Color Yellow     Character Clear     Specific Gravity 1.022     Ph 5.5     Glucose Negative mg/dL      Ketones Negative mg/dL      Protein Negative mg/dL      Bilirubin Negative     Urobilinogen, Urine 0.2     Nitrite Negative     Leukocyte Esterase Small     Occult Blood Negative     Micro Urine Req Microscopic    CHLAMYDIA & GC BY PCR [[id number]]  (Abnormal) Collected:  07/29/19 0215    Order Status:  Completed Specimen:  Genital Updated:  07/31/19 2118     C. trachomatis by PCR POSITIVE     N. gonorrhoeae by PCR Negative     Source Urine    URINE CULTURE(NEW) [[id number]] Collected:  07/29/19 0215    Order Status:  Completed Specimen:  Urine Updated:  07/31/19 0900     Significant Indicator NEG     Source UR     Site -     Culture Result Mixed skin sinai " ",000 cfu/mL    WET PREP [[id number]] Collected:  07/29/19 0330    Order Status:  Completed Specimen:  Genital from Vaginal Updated:  07/29/19 0342     Significant Indicator NEG     Source GEN     Site VAGINAL     Wet Prep For Parasites Moderate WBCs seen.  Few motile Trichomonas seen.  No yeast.  No clue cells seen.            Plan:   Pt treated appropriately for chlamydia in ED. Called pt to inform them of positive result and  them to abstain from sexual activity for 7 days from treatment, inform any partners in the last 60 days, and to follow up with health department for any other tests/information. Pt did not answer, left generic voicemail asking pt to call back culture line at [phone]. If pt calls back, recommend counseling them on points above.    Sent pt \"unable to contact (STD treated)\" letter.    Gianfranco Livingston, PharmD     Update: Pt called back. Counseled pt on positive chlamydia test, to abstain from sexual activity for 7 days from treatment on 7/29, and to inform any sexual partners in the last 60 days. Offered the pt the appt phone number for the health department to f/u for more information and other testing. Pt stated that they already had the number.      "

## 2019-08-15 ENCOUNTER — GYNECOLOGY VISIT (OUTPATIENT)
Dept: OBGYN | Facility: MEDICAL CENTER | Age: 19
End: 2019-08-15
Payer: COMMERCIAL

## 2019-08-15 VITALS — SYSTOLIC BLOOD PRESSURE: 90 MMHG | WEIGHT: 126 LBS | DIASTOLIC BLOOD PRESSURE: 64 MMHG | BODY MASS INDEX: 22.32 KG/M2

## 2019-08-15 DIAGNOSIS — A74.9 CHLAMYDIA: Primary | ICD-10-CM

## 2019-08-15 DIAGNOSIS — N92.6 IRREGULAR BLEEDING: ICD-10-CM

## 2019-08-15 LAB
INT CON NEG: NEGATIVE
INT CON POS: POSITIVE
POC URINE PREGNANCY TEST: NEGATIVE

## 2019-08-15 PROCEDURE — 81025 URINE PREGNANCY TEST: CPT | Performed by: OBSTETRICS & GYNECOLOGY

## 2019-08-15 PROCEDURE — 99214 OFFICE O/P EST MOD 30 MIN: CPT | Performed by: OBSTETRICS & GYNECOLOGY

## 2019-08-16 NOTE — PROGRESS NOTES
S: Anais presents for follow-up from recent ER visit on 7/29/2019 for complaints of dyspareunia and low back pain with irregular bleeding.  She was diagnosed with trichomonas and chlamydia.  Was treated with Rocephin, azithromycin, and Flagyl in the emergency department.  She states her pain is improved.  She does not know when her last menstrual period was because she has irregular bleeding.  She states she was bleeding almost every day.  After her recent miscarriage she had no menses for 2 months, and then had frequent irregular bleeding.  She states her partner was treated appropriately.  Denies fevers, chills, nausea, vomiting, dysuria, or vaginal discharge.    O:BP (!) 90/64   Wt 57.2 kg (126 lb)      GENERAL: Alert, in no apparent distress  PSYCHIATRIC: Appropriate affect, intact insight and judgement.  ABDOMEN: Soft, nontender, nondistended.  No palpable masses.  No rebound or guarding.  No inguinal lymphadenopathy.  No hepatosplenomegaly.  No hernias.  BACK: No CVA tenderness  EXTREMITIES: No edema  SKIN: No rash    GENITOURINARY:  Normal external genitalia, no lesions.  Normal urethral meatus, no masses or tenderness.  Normal bladder without fullness or masses.  Vagina well estrogenized, no vaginal discharge or lesions. No blood in vault.   Cervix without lesions or discharge, nontender. No ectropion or cervicitis noted.   Uterus normal size, shape, and contour, nontender.  Adnexa nontender, no masses.  Normal anus and perineum.    Rectal Exam - not indicated.    A/P:   1. Irregular bleeding -I suspect the irregular bleeding was due to chlamydia infection and cervicitis.  She has been appropriately treated and her symptoms have resolved.  She denies bleeding or pain today.  2.  Chlamydial infection -status post treatment with azithromycin.  She states her partner was treated.  It is too early to do a test of cure today.  She is not pregnant, so technically a test of cure is not indicated.  We will plan for a  test of reinfection in 2-1/2 months and also follow-up on irregular menses at that time.    F/U 2.5 months

## 2020-08-26 ENCOUNTER — APPOINTMENT (OUTPATIENT)
Dept: RADIOLOGY | Facility: MEDICAL CENTER | Age: 20
End: 2020-08-26
Attending: PHYSICIAN ASSISTANT
Payer: MEDICAID

## 2020-08-26 DIAGNOSIS — Z12.31 VISIT FOR SCREENING MAMMOGRAM: ICD-10-CM

## 2020-10-28 ENCOUNTER — HOSPITAL ENCOUNTER (OUTPATIENT)
Dept: LAB | Facility: MEDICAL CENTER | Age: 20
End: 2020-10-28
Attending: STUDENT IN AN ORGANIZED HEALTH CARE EDUCATION/TRAINING PROGRAM
Payer: COMMERCIAL

## 2020-10-28 LAB
BASOPHILS # BLD AUTO: 0.7 % (ref 0–1.8)
BASOPHILS # BLD: 0.08 K/UL (ref 0–0.12)
EOSINOPHIL # BLD AUTO: 0.53 K/UL (ref 0–0.51)
EOSINOPHIL NFR BLD: 4.8 % (ref 0–6.9)
ERYTHROCYTE [DISTWIDTH] IN BLOOD BY AUTOMATED COUNT: 44.9 FL (ref 35.9–50)
HCT VFR BLD AUTO: 45.8 % (ref 37–47)
HGB BLD-MCNC: 15.3 G/DL (ref 12–16)
IMM GRANULOCYTES # BLD AUTO: 0.03 K/UL (ref 0–0.11)
IMM GRANULOCYTES NFR BLD AUTO: 0.3 % (ref 0–0.9)
LYMPHOCYTES # BLD AUTO: 2.27 K/UL (ref 1–4.8)
LYMPHOCYTES NFR BLD: 20.6 % (ref 22–41)
MCH RBC QN AUTO: 31.7 PG (ref 27–33)
MCHC RBC AUTO-ENTMCNC: 33.4 G/DL (ref 33.6–35)
MCV RBC AUTO: 94.8 FL (ref 81.4–97.8)
MONOCYTES # BLD AUTO: 0.67 K/UL (ref 0–0.85)
MONOCYTES NFR BLD AUTO: 6.1 % (ref 0–13.4)
NEUTROPHILS # BLD AUTO: 7.46 K/UL (ref 2–7.15)
NEUTROPHILS NFR BLD: 67.5 % (ref 44–72)
NRBC # BLD AUTO: 0 K/UL
NRBC BLD-RTO: 0 /100 WBC
PLATELET # BLD AUTO: 249 K/UL (ref 164–446)
PMV BLD AUTO: 11.3 FL (ref 9–12.9)
RBC # BLD AUTO: 4.83 M/UL (ref 4.2–5.4)
T4 FREE SERPL-MCNC: 1.3 NG/DL (ref 0.93–1.7)
TSH SERPL DL<=0.005 MIU/L-ACNC: 0.28 UIU/ML (ref 0.38–5.33)
WBC # BLD AUTO: 11 K/UL (ref 4.8–10.8)

## 2020-10-28 PROCEDURE — 84443 ASSAY THYROID STIM HORMONE: CPT

## 2020-10-28 PROCEDURE — 36415 COLL VENOUS BLD VENIPUNCTURE: CPT

## 2020-10-28 PROCEDURE — 84439 ASSAY OF FREE THYROXINE: CPT

## 2020-10-28 PROCEDURE — 84481 FREE ASSAY (FT-3): CPT

## 2020-10-28 PROCEDURE — 85025 COMPLETE CBC W/AUTO DIFF WBC: CPT

## 2020-10-28 PROCEDURE — 80053 COMPREHEN METABOLIC PANEL: CPT

## 2020-10-29 LAB
ALBUMIN SERPL BCP-MCNC: 4.5 G/DL (ref 3.2–4.9)
ALBUMIN/GLOB SERPL: 1.8 G/DL
ALP SERPL-CCNC: 82 U/L (ref 30–99)
ALT SERPL-CCNC: 11 U/L (ref 2–50)
ANION GAP SERPL CALC-SCNC: 6 MMOL/L (ref 7–16)
AST SERPL-CCNC: 12 U/L (ref 12–45)
BILIRUB SERPL-MCNC: 0.4 MG/DL (ref 0.1–1.5)
BUN SERPL-MCNC: 10 MG/DL (ref 8–22)
CALCIUM SERPL-MCNC: 10 MG/DL (ref 8.5–10.5)
CHLORIDE SERPL-SCNC: 104 MMOL/L (ref 96–112)
CO2 SERPL-SCNC: 25 MMOL/L (ref 20–33)
CREAT SERPL-MCNC: 0.62 MG/DL (ref 0.5–1.4)
GLOBULIN SER CALC-MCNC: 2.5 G/DL (ref 1.9–3.5)
GLUCOSE SERPL-MCNC: 90 MG/DL (ref 65–99)
POTASSIUM SERPL-SCNC: 3.8 MMOL/L (ref 3.6–5.5)
PROT SERPL-MCNC: 7 G/DL (ref 6–8.2)
SODIUM SERPL-SCNC: 135 MMOL/L (ref 135–145)
T3FREE SERPL-MCNC: 3.32 PG/ML (ref 2–4.4)

## 2020-11-14 ENCOUNTER — HOSPITAL ENCOUNTER (EMERGENCY)
Facility: MEDICAL CENTER | Age: 20
End: 2020-11-14
Attending: EMERGENCY MEDICINE
Payer: COMMERCIAL

## 2020-11-14 ENCOUNTER — APPOINTMENT (OUTPATIENT)
Dept: RADIOLOGY | Facility: MEDICAL CENTER | Age: 20
End: 2020-11-14
Attending: EMERGENCY MEDICINE
Payer: COMMERCIAL

## 2020-11-14 VITALS
BODY MASS INDEX: 23.05 KG/M2 | OXYGEN SATURATION: 96 % | WEIGHT: 130.07 LBS | HEIGHT: 63 IN | DIASTOLIC BLOOD PRESSURE: 75 MMHG | RESPIRATION RATE: 18 BRPM | HEART RATE: 64 BPM | SYSTOLIC BLOOD PRESSURE: 121 MMHG | TEMPERATURE: 97 F

## 2020-11-14 DIAGNOSIS — R10.11 RUQ ABDOMINAL PAIN: ICD-10-CM

## 2020-11-14 DIAGNOSIS — R11.2 NAUSEA, VOMITING AND DIARRHEA: ICD-10-CM

## 2020-11-14 DIAGNOSIS — R19.7 NAUSEA, VOMITING AND DIARRHEA: ICD-10-CM

## 2020-11-14 LAB
ALBUMIN SERPL BCP-MCNC: 4.3 G/DL (ref 3.2–4.9)
ALBUMIN/GLOB SERPL: 1.2 G/DL
ALP SERPL-CCNC: 98 U/L (ref 30–99)
ALT SERPL-CCNC: 12 U/L (ref 2–50)
ANION GAP SERPL CALC-SCNC: 14 MMOL/L (ref 7–16)
APPEARANCE UR: CLEAR
AST SERPL-CCNC: 19 U/L (ref 12–45)
BACTERIA #/AREA URNS HPF: ABNORMAL /HPF
BASOPHILS # BLD AUTO: 0.6 % (ref 0–1.8)
BASOPHILS # BLD: 0.09 K/UL (ref 0–0.12)
BILIRUB SERPL-MCNC: 0.7 MG/DL (ref 0.1–1.5)
BILIRUB UR QL STRIP.AUTO: ABNORMAL
BUN SERPL-MCNC: 11 MG/DL (ref 8–22)
CALCIUM SERPL-MCNC: 9.7 MG/DL (ref 8.4–10.2)
CHLORIDE SERPL-SCNC: 105 MMOL/L (ref 96–112)
CO2 SERPL-SCNC: 20 MMOL/L (ref 20–33)
COLOR UR: YELLOW
CREAT SERPL-MCNC: 0.67 MG/DL (ref 0.5–1.4)
EOSINOPHIL # BLD AUTO: 0.22 K/UL (ref 0–0.51)
EOSINOPHIL NFR BLD: 1.6 % (ref 0–6.9)
EPI CELLS #/AREA URNS HPF: ABNORMAL /HPF
ERYTHROCYTE [DISTWIDTH] IN BLOOD BY AUTOMATED COUNT: 42.5 FL (ref 35.9–50)
GLOBULIN SER CALC-MCNC: 3.6 G/DL (ref 1.9–3.5)
GLUCOSE SERPL-MCNC: 96 MG/DL (ref 65–99)
GLUCOSE UR STRIP.AUTO-MCNC: NEGATIVE MG/DL
HCG UR QL: NEGATIVE
HCT VFR BLD AUTO: 46.1 % (ref 37–47)
HGB BLD-MCNC: 15.8 G/DL (ref 12–16)
IMM GRANULOCYTES # BLD AUTO: 0.04 K/UL (ref 0–0.11)
IMM GRANULOCYTES NFR BLD AUTO: 0.3 % (ref 0–0.9)
KETONES UR STRIP.AUTO-MCNC: >=80 MG/DL
LEUKOCYTE ESTERASE UR QL STRIP.AUTO: NEGATIVE
LIPASE SERPL-CCNC: 18 U/L (ref 7–58)
LYMPHOCYTES # BLD AUTO: 2.53 K/UL (ref 1–4.8)
LYMPHOCYTES NFR BLD: 18.1 % (ref 22–41)
MCH RBC QN AUTO: 31.4 PG (ref 27–33)
MCHC RBC AUTO-ENTMCNC: 34.3 G/DL (ref 33.6–35)
MCV RBC AUTO: 91.7 FL (ref 81.4–97.8)
MICRO URNS: ABNORMAL
MONOCYTES # BLD AUTO: 0.97 K/UL (ref 0–0.85)
MONOCYTES NFR BLD AUTO: 6.9 % (ref 0–13.4)
MUCOUS THREADS #/AREA URNS HPF: ABNORMAL /HPF
NEUTROPHILS # BLD AUTO: 10.12 K/UL (ref 2–7.15)
NEUTROPHILS NFR BLD: 72.5 % (ref 44–72)
NITRITE UR QL STRIP.AUTO: NEGATIVE
NRBC # BLD AUTO: 0 K/UL
NRBC BLD-RTO: 0 /100 WBC
PH UR STRIP.AUTO: 6 [PH] (ref 5–8)
PLATELET # BLD AUTO: 299 K/UL (ref 164–446)
PMV BLD AUTO: 10.3 FL (ref 9–12.9)
POTASSIUM SERPL-SCNC: 3.9 MMOL/L (ref 3.6–5.5)
PROT SERPL-MCNC: 7.9 G/DL (ref 6–8.2)
PROT UR QL STRIP: NEGATIVE MG/DL
RBC # BLD AUTO: 5.03 M/UL (ref 4.2–5.4)
RBC # URNS HPF: ABNORMAL /HPF
RBC UR QL AUTO: ABNORMAL
SODIUM SERPL-SCNC: 139 MMOL/L (ref 135–145)
SP GR UR REFRACTOMETRY: 1.03
SP GR UR STRIP.AUTO: 1.02
WBC # BLD AUTO: 14 K/UL (ref 4.8–10.8)
WBC #/AREA URNS HPF: ABNORMAL /HPF

## 2020-11-14 PROCEDURE — 80053 COMPREHEN METABOLIC PANEL: CPT

## 2020-11-14 PROCEDURE — 96374 THER/PROPH/DIAG INJ IV PUSH: CPT

## 2020-11-14 PROCEDURE — 83690 ASSAY OF LIPASE: CPT

## 2020-11-14 PROCEDURE — 700111 HCHG RX REV CODE 636 W/ 250 OVERRIDE (IP): Performed by: EMERGENCY MEDICINE

## 2020-11-14 PROCEDURE — 99284 EMERGENCY DEPT VISIT MOD MDM: CPT

## 2020-11-14 PROCEDURE — 85025 COMPLETE CBC W/AUTO DIFF WBC: CPT

## 2020-11-14 PROCEDURE — 81025 URINE PREGNANCY TEST: CPT

## 2020-11-14 PROCEDURE — 81001 URINALYSIS AUTO W/SCOPE: CPT

## 2020-11-14 RX ORDER — PROCHLORPERAZINE EDISYLATE 5 MG/ML
5 INJECTION INTRAMUSCULAR; INTRAVENOUS ONCE
Status: COMPLETED | OUTPATIENT
Start: 2020-11-14 | End: 2020-11-14

## 2020-11-14 RX ADMIN — PROCHLORPERAZINE EDISYLATE 5 MG: 5 INJECTION INTRAMUSCULAR; INTRAVENOUS at 17:47

## 2020-11-14 ASSESSMENT — FIBROSIS 4 INDEX: FIB4 SCORE: 0.31

## 2020-11-15 NOTE — ED TRIAGE NOTES
"Pt presents with a possible \"abdominal hernia\" ( as she states) observed today.  C/O transitory nausea, and anxiety.   Chief Complaint   Patient presents with   • Hernia   • Anxiety   • Nausea     /66   Pulse 66   Temp 36.1 °C (97 °F) (Temporal)   Resp 20   Ht 1.6 m (5' 3\")   Wt 59 kg (130 lb 1.1 oz)   LMP 11/14/2020 (Exact Date)   SpO2 99%   BMI 23.04 kg/m²      "

## 2020-11-15 NOTE — ED PROVIDER NOTES
"ED Provider Note    CHIEF COMPLAINT  Chief Complaint   Patient presents with   • Hernia   • Anxiety   • Nausea        [facility]    Primary care provider: Lisa Graham P.A.-C.   History obtained from: Patient  History limited by: None     Anais Larsen is a 19 y.o. female who presents to the ED complaining of a painful \"lump\" that she noticed while taking a shower this morning in her right upper quadrant abdomen.  Patient states that she did a Google search and was concerned about having liver cancer so she came to the ED.  She reports that the pain is only on the right side and radiates all the way up to her right shoulder and down to her right lower quadrant.  She otherwise denies pain anywhere else.  She reports having a few episodes of nausea and vomiting after eating today with emesis looking like \"stomach acid\" without gross blood noted.  She reports 2 episodes of watery diarrhea today without gross blood noted.  She denies dysuria or possibility of pregnancy and reports that she is currently on her period.  She denies recent fever/chills/congestion/sore throat/cough/change in taste or smell/shortness of breath or difficulty breathing/rash/swelling.  No recent travels or known ill contacts.  No previous abdominal surgeries.  She has not noticed anything that makes her symptoms better.  She also reports feeling very shaky recently.  Patient also reports she has some blood test performed recently showing a high white count and low RBC.    REVIEW OF SYSTEMS  Please see HPI for pertinent positives/negatives.  All other systems reviewed and are negative.     PAST MEDICAL HISTORY  Past Medical History:   Diagnosis Date   • Miscarriage    • Syphilis         SURGICAL HISTORY  History reviewed. No pertinent surgical history.     SOCIAL HISTORY  Social History     Tobacco Use   • Smoking status: Never Smoker   • Smokeless tobacco: Never Used   Substance and Sexual Activity   • Alcohol use: Yes     Comment: " "Occasionally   • Drug use: Yes     Types: Inhaled     Comment: marijuana daily   • Sexual activity: Yes     Partners: Male     Comment: contraception         FAMILY HISTORY  Family History   Problem Relation Age of Onset   • Hypertension Father    • Hyperlipidemia Father    • Heart Disease Paternal Grandfather    • Cancer Other         breast Ca        CURRENT MEDICATIONS  Home Medications    **Home medications have not yet been reviewed for this encounter**          ALLERGIES  Allergies   Allergen Reactions   • Penicillin G Rash     rash   • Sulfa Drugs Swelling        PHYSICAL EXAM  VITAL SIGNS: /75   Pulse 64   Temp 36.1 °C (97 °F)   Resp 18   Ht 1.6 m (5' 3\")   Wt 59 kg (130 lb 1.1 oz)   LMP 11/14/2020 (Exact Date)   SpO2 96%   BMI 23.04 kg/m²  @IVELISSE[[id number]::@     Pulse ox interpretation: 99% I interpret this pulse ox as normal     Constitutional: Well developed, well nourished, alert in no apparent distress, nontoxic appearance    HENT: No external signs of trauma, normocephalic, mask on due to COVID-19 pandemic  Eyes: PERRL, conjunctiva without erythema, no discharge, no icterus    Neck: Soft and supple, trachea midline, no stridor, no tenderness, no LAD, no JVD, good ROM    Cardiovascular: Regular rate and rhythm, no murmurs/rubs/gallops, strong distal pulses and good perfusion    Thorax & Lungs: No respiratory distress, CTAB   Abdomen: Soft, mild right upper quadrant tenderness to palpation without palpable mass/swelling/crepitus/fluctuance/warmth, nondistended, no guarding, no rebound, normal BS    Back: No CVAT    Extremities: No cyanosis, no edema, no gross deformity, good ROM, no tenderness, intact distal pulses with brisk cap refill    Skin: Warm, dry, no pallor/cyanosis, no rash noted    Lymphatic: No lymphadenopathy noted    Neuro: A/O times 3, no focal deficits noted, ambulating without difficulty  Psychiatric: Cooperative, slightly anxious      DIAGNOSTIC STUDIES / " PROCEDURES        LABS  All labs reviewed by me.     Results for orders placed or performed during the hospital encounter of 11/14/20   CBC WITH DIFFERENTIAL   Result Value Ref Range    WBC 14.0 (H) 4.8 - 10.8 K/uL    RBC 5.03 4.20 - 5.40 M/uL    Hemoglobin 15.8 12.0 - 16.0 g/dL    Hematocrit 46.1 37.0 - 47.0 %    MCV 91.7 81.4 - 97.8 fL    MCH 31.4 27.0 - 33.0 pg    MCHC 34.3 33.6 - 35.0 g/dL    RDW 42.5 35.9 - 50.0 fL    Platelet Count 299 164 - 446 K/uL    MPV 10.3 9.0 - 12.9 fL    Neutrophils-Polys 72.50 (H) 44.00 - 72.00 %    Lymphocytes 18.10 (L) 22.00 - 41.00 %    Monocytes 6.90 0.00 - 13.40 %    Eosinophils 1.60 0.00 - 6.90 %    Basophils 0.60 0.00 - 1.80 %    Immature Granulocytes 0.30 0.00 - 0.90 %    Nucleated RBC 0.00 /100 WBC    Neutrophils (Absolute) 10.12 (H) 2.00 - 7.15 K/uL    Lymphs (Absolute) 2.53 1.00 - 4.80 K/uL    Monos (Absolute) 0.97 (H) 0.00 - 0.85 K/uL    Eos (Absolute) 0.22 0.00 - 0.51 K/uL    Baso (Absolute) 0.09 0.00 - 0.12 K/uL    Immature Granulocytes (abs) 0.04 0.00 - 0.11 K/uL    NRBC (Absolute) 0.00 K/uL   COMP METABOLIC PANEL   Result Value Ref Range    Sodium 139 135 - 145 mmol/L    Potassium 3.9 3.6 - 5.5 mmol/L    Chloride 105 96 - 112 mmol/L    Co2 20 20 - 33 mmol/L    Anion Gap 14.0 7.0 - 16.0    Glucose 96 65 - 99 mg/dL    Bun 11 8 - 22 mg/dL    Creatinine 0.67 0.50 - 1.40 mg/dL    Calcium 9.7 8.4 - 10.2 mg/dL    AST(SGOT) 19 12 - 45 U/L    ALT(SGPT) 12 2 - 50 U/L    Alkaline Phosphatase 98 30 - 99 U/L    Total Bilirubin 0.7 0.1 - 1.5 mg/dL    Albumin 4.3 3.2 - 4.9 g/dL    Total Protein 7.9 6.0 - 8.2 g/dL    Globulin 3.6 (H) 1.9 - 3.5 g/dL    A-G Ratio 1.2 g/dL   LIPASE   Result Value Ref Range    Lipase 18 7 - 58 U/L   URINALYSIS (UA)    Specimen: Urine, Clean Catch; Blood   Result Value Ref Range    Color Yellow     Character Clear     Specific Gravity 1.025 <1.035    Ph 6.0 5.0 - 8.0    Glucose Negative Negative mg/dL    Ketones >=80 (A) Negative mg/dL    Protein Negative  Negative mg/dL    Bilirubin Small (A) Negative    Nitrite Negative Negative    Leukocyte Esterase Negative Negative    Occult Blood Large (A) Negative    Micro Urine Req Microscopic    BETA-HCG QUALITATIVE URINE   Result Value Ref Range    Beta-Hcg Urine Negative Negative   REFRACTOMETER SG   Result Value Ref Range    Specific Gravity 1.028    URINE MICROSCOPIC (W/UA)   Result Value Ref Range    WBC 5-10 (A) /hpf    RBC 0-2 /hpf    Bacteria Moderate (A) None /hpf    Epithelial Cells Few Few /hpf    Mucous Threads Many /hpf   ESTIMATED GFR   Result Value Ref Range    GFR If African American >60 >60 mL/min/1.73 m 2    GFR If Non African American >60 >60 mL/min/1.73 m 2        RADIOLOGY  The radiologist's interpretation of all radiological studies have been reviewed by me.     No orders to display          COURSE & MEDICAL DECISION MAKING  Nursing notes, VS, PMSFHx reviewed in chart.     Review of past medical records shows the patient was last seen at [facility] ED July 29, 2019 for vaginal bleeding, dyspareunia and low back pain.      Differential diagnoses considered include but are not limited to: Appendicitis, ileus, cholecystitis/ascending cholangitis, gallstone/biliary colic, pancreatitis, PUD, gastritis, gastroenteritis, muscle strain, hernia, pregnancy/ectopic      History and physical exam as above.  Patient initially agreed to testing but subsequently changed her mind and requested to leave the ED.  She was noted to be in no acute distress and nontoxic in appearance.  She did feel a little more jittery after being given Compazine but refused Benadryl or further intervention.  Patient was encouraged to return to the ED if she changes her mind.  She verbalized understanding with no further questions or concerns.       The patient is leaving against medical advice.  I discussed with the patient the risks of leaving without receiving appropriate care to include permanent disability or death.  I have  discussed possible alternatives.  The patient is not intoxicated clinically and has the capacity to understand the risks of her decision.  While I disagree with the patient's decision, I respect the patient's autonomy and will not keep her here against her will.  The patient was given discharge instructions and a followup plan as documented in the medical record.  I have advised the patient that she can return to the ED at any time.        The patient is referred to a primary physician for blood pressure management, diabetic screening, and for all other preventative health concerns.       FINAL IMPRESSION  1. RUQ abdominal pain Acute   2. Nausea, vomiting and diarrhea Acute          DISPOSITION  Patient signed out AMA      FOLLOW UP  Lisa Graham P.A.-C.  [address] [facility]  [place] [id number] [phone]    Call in 2 days      [facility], Emergency Dept  [address] [facility]  [place] [id number] [phone]    If symptoms worsen         OUTPATIENT MEDICATIONS  There are no discharge medications for this patient.         Electronically signed by: Roland Morrison D.O., 11/14/2020 5:40 PM      Portions of this record were made with voice recognition software.  Despite my review, spelling/grammar/context errors may still remain.  Interpretation of this chart should be taken in this context.

## 2020-11-15 NOTE — ED NOTES
"Patient returned from ultrasound after refusing exam. Discussed jittery symptoms that compazine can sometimes produce and offered benadryl. Pt declined and replied \"I just want to go.\" MD informed and at bedside to discuss AMA. Patient encouraged to return at any time.   "

## 2020-12-14 ENCOUNTER — APPOINTMENT (OUTPATIENT)
Dept: RADIOLOGY | Facility: MEDICAL CENTER | Age: 20
End: 2020-12-14
Attending: STUDENT IN AN ORGANIZED HEALTH CARE EDUCATION/TRAINING PROGRAM
Payer: COMMERCIAL

## 2020-12-18 ENCOUNTER — APPOINTMENT (OUTPATIENT)
Dept: RADIOLOGY | Facility: MEDICAL CENTER | Age: 20
End: 2020-12-18
Attending: STUDENT IN AN ORGANIZED HEALTH CARE EDUCATION/TRAINING PROGRAM
Payer: COMMERCIAL

## 2021-12-22 ENCOUNTER — HOSPITAL ENCOUNTER (EMERGENCY)
Facility: MEDICAL CENTER | Age: 21
End: 2021-12-22

## 2021-12-22 VITALS
DIASTOLIC BLOOD PRESSURE: 80 MMHG | WEIGHT: 132.5 LBS | SYSTOLIC BLOOD PRESSURE: 141 MMHG | HEART RATE: 65 BPM | HEIGHT: 63 IN | RESPIRATION RATE: 18 BRPM | OXYGEN SATURATION: 94 % | TEMPERATURE: 97.9 F | BODY MASS INDEX: 23.48 KG/M2

## 2021-12-22 PROCEDURE — 302449 STATCHG TRIAGE ONLY (STATISTIC)

## 2021-12-22 ASSESSMENT — FIBROSIS 4 INDEX: FIB4 SCORE: 0.42

## 2021-12-22 ASSESSMENT — PAIN SCALES - WONG BAKER: WONGBAKER_NUMERICALRESPONSE: HURTS A WHOLE LOT

## 2021-12-24 ENCOUNTER — HOSPITAL ENCOUNTER (EMERGENCY)
Facility: MEDICAL CENTER | Age: 21
End: 2021-12-24
Attending: EMERGENCY MEDICINE

## 2021-12-24 VITALS
DIASTOLIC BLOOD PRESSURE: 70 MMHG | RESPIRATION RATE: 18 BRPM | HEIGHT: 63 IN | OXYGEN SATURATION: 99 % | SYSTOLIC BLOOD PRESSURE: 140 MMHG | WEIGHT: 132.5 LBS | BODY MASS INDEX: 23.48 KG/M2 | TEMPERATURE: 97.8 F | HEART RATE: 55 BPM

## 2021-12-24 DIAGNOSIS — R19.7 NAUSEA VOMITING AND DIARRHEA: ICD-10-CM

## 2021-12-24 DIAGNOSIS — R11.2 NAUSEA VOMITING AND DIARRHEA: ICD-10-CM

## 2021-12-24 DIAGNOSIS — R10.13 EPIGASTRIC PAIN: ICD-10-CM

## 2021-12-24 LAB
ALBUMIN SERPL BCP-MCNC: 4.4 G/DL (ref 3.2–4.9)
ALBUMIN/GLOB SERPL: 1.4 G/DL
ALP SERPL-CCNC: 87 U/L (ref 30–99)
ALT SERPL-CCNC: 15 U/L (ref 2–50)
ANION GAP SERPL CALC-SCNC: 14 MMOL/L (ref 7–16)
APPEARANCE UR: CLEAR
AST SERPL-CCNC: 15 U/L (ref 12–45)
BACTERIA #/AREA URNS HPF: ABNORMAL /HPF
BASOPHILS # BLD AUTO: 0.3 % (ref 0–1.8)
BASOPHILS # BLD: 0.03 K/UL (ref 0–0.12)
BILIRUB SERPL-MCNC: 0.6 MG/DL (ref 0.1–1.5)
BILIRUB UR QL STRIP.AUTO: NEGATIVE
BUN SERPL-MCNC: 14 MG/DL (ref 8–22)
CALCIUM SERPL-MCNC: 9.4 MG/DL (ref 8.4–10.2)
CHLORIDE SERPL-SCNC: 103 MMOL/L (ref 96–112)
CO2 SERPL-SCNC: 24 MMOL/L (ref 20–33)
COLOR UR: YELLOW
CREAT SERPL-MCNC: 0.87 MG/DL (ref 0.5–1.4)
EOSINOPHIL # BLD AUTO: 0.01 K/UL (ref 0–0.51)
EOSINOPHIL NFR BLD: 0.1 % (ref 0–6.9)
EPI CELLS #/AREA URNS HPF: ABNORMAL /HPF
ERYTHROCYTE [DISTWIDTH] IN BLOOD BY AUTOMATED COUNT: 40.1 FL (ref 35.9–50)
GLOBULIN SER CALC-MCNC: 3.1 G/DL (ref 1.9–3.5)
GLUCOSE SERPL-MCNC: 108 MG/DL (ref 65–99)
GLUCOSE UR STRIP.AUTO-MCNC: NEGATIVE MG/DL
HCG UR QL: NEGATIVE
HCT VFR BLD AUTO: 46.1 % (ref 37–47)
HGB BLD-MCNC: 15.7 G/DL (ref 12–16)
IMM GRANULOCYTES # BLD AUTO: 0.03 K/UL (ref 0–0.11)
IMM GRANULOCYTES NFR BLD AUTO: 0.3 % (ref 0–0.9)
KETONES UR STRIP.AUTO-MCNC: >=80 MG/DL
LEUKOCYTE ESTERASE UR QL STRIP.AUTO: NEGATIVE
LIPASE SERPL-CCNC: 23 U/L (ref 7–58)
LYMPHOCYTES # BLD AUTO: 1.37 K/UL (ref 1–4.8)
LYMPHOCYTES NFR BLD: 13.7 % (ref 22–41)
MCH RBC QN AUTO: 31.6 PG (ref 27–33)
MCHC RBC AUTO-ENTMCNC: 34.1 G/DL (ref 33.6–35)
MCV RBC AUTO: 92.8 FL (ref 81.4–97.8)
MICRO URNS: ABNORMAL
MONOCYTES # BLD AUTO: 0.48 K/UL (ref 0–0.85)
MONOCYTES NFR BLD AUTO: 4.8 % (ref 0–13.4)
MUCOUS THREADS #/AREA URNS HPF: ABNORMAL /HPF
NEUTROPHILS # BLD AUTO: 8.08 K/UL (ref 2–7.15)
NEUTROPHILS NFR BLD: 80.8 % (ref 44–72)
NITRITE UR QL STRIP.AUTO: POSITIVE
NRBC # BLD AUTO: 0 K/UL
NRBC BLD-RTO: 0 /100 WBC
PH UR STRIP.AUTO: 8 [PH] (ref 5–8)
PLATELET # BLD AUTO: 269 K/UL (ref 164–446)
PMV BLD AUTO: 9.9 FL (ref 9–12.9)
POTASSIUM SERPL-SCNC: 3.7 MMOL/L (ref 3.6–5.5)
PROT SERPL-MCNC: 7.5 G/DL (ref 6–8.2)
PROT UR QL STRIP: NEGATIVE MG/DL
RBC # BLD AUTO: 4.97 M/UL (ref 4.2–5.4)
RBC # URNS HPF: ABNORMAL /HPF
RBC UR QL AUTO: NEGATIVE
SODIUM SERPL-SCNC: 141 MMOL/L (ref 135–145)
SP GR UR STRIP.AUTO: 1.02
UNIDENT CRYS URNS QL MICRO: ABNORMAL /HPF
WBC # BLD AUTO: 10 K/UL (ref 4.8–10.8)
WBC #/AREA URNS HPF: ABNORMAL /HPF

## 2021-12-24 PROCEDURE — 700105 HCHG RX REV CODE 258: Performed by: EMERGENCY MEDICINE

## 2021-12-24 PROCEDURE — A9270 NON-COVERED ITEM OR SERVICE: HCPCS | Performed by: EMERGENCY MEDICINE

## 2021-12-24 PROCEDURE — 700111 HCHG RX REV CODE 636 W/ 250 OVERRIDE (IP): Performed by: EMERGENCY MEDICINE

## 2021-12-24 PROCEDURE — 700102 HCHG RX REV CODE 250 W/ 637 OVERRIDE(OP): Performed by: EMERGENCY MEDICINE

## 2021-12-24 PROCEDURE — 99285 EMERGENCY DEPT VISIT HI MDM: CPT

## 2021-12-24 PROCEDURE — 81025 URINE PREGNANCY TEST: CPT

## 2021-12-24 PROCEDURE — 80053 COMPREHEN METABOLIC PANEL: CPT

## 2021-12-24 PROCEDURE — 87186 SC STD MICRODIL/AGAR DIL: CPT

## 2021-12-24 PROCEDURE — 87086 URINE CULTURE/COLONY COUNT: CPT

## 2021-12-24 PROCEDURE — 85025 COMPLETE CBC W/AUTO DIFF WBC: CPT

## 2021-12-24 PROCEDURE — 81001 URINALYSIS AUTO W/SCOPE: CPT

## 2021-12-24 PROCEDURE — 96374 THER/PROPH/DIAG INJ IV PUSH: CPT

## 2021-12-24 PROCEDURE — 87077 CULTURE AEROBIC IDENTIFY: CPT

## 2021-12-24 PROCEDURE — 83690 ASSAY OF LIPASE: CPT

## 2021-12-24 RX ORDER — ONDANSETRON 2 MG/ML
4 INJECTION INTRAMUSCULAR; INTRAVENOUS ONCE
Status: COMPLETED | OUTPATIENT
Start: 2021-12-24 | End: 2021-12-24

## 2021-12-24 RX ORDER — ONDANSETRON 4 MG/1
4 TABLET, ORALLY DISINTEGRATING ORAL EVERY 8 HOURS PRN
Qty: 10 TABLET | Refills: 0 | Status: SHIPPED | OUTPATIENT
Start: 2021-12-24

## 2021-12-24 RX ORDER — DEXTROSE AND SODIUM CHLORIDE 5; .9 G/100ML; G/100ML
1000 INJECTION, SOLUTION INTRAVENOUS CONTINUOUS
Status: DISCONTINUED | OUTPATIENT
Start: 2021-12-24 | End: 2021-12-24 | Stop reason: HOSPADM

## 2021-12-24 RX ORDER — IBUPROFEN 600 MG/1
600 TABLET ORAL ONCE
Status: COMPLETED | OUTPATIENT
Start: 2021-12-24 | End: 2021-12-24

## 2021-12-24 RX ORDER — ONDANSETRON 4 MG/1
4 TABLET, ORALLY DISINTEGRATING ORAL ONCE
Status: COMPLETED | OUTPATIENT
Start: 2021-12-24 | End: 2021-12-24

## 2021-12-24 RX ORDER — ACETAMINOPHEN 325 MG/1
650 TABLET ORAL ONCE
Status: COMPLETED | OUTPATIENT
Start: 2021-12-24 | End: 2021-12-24

## 2021-12-24 RX ADMIN — ONDANSETRON 4 MG: 2 INJECTION INTRAMUSCULAR; INTRAVENOUS at 11:29

## 2021-12-24 RX ADMIN — DEXTROSE AND SODIUM CHLORIDE 1000 ML: 5; 900 INJECTION, SOLUTION INTRAVENOUS at 11:30

## 2021-12-24 RX ADMIN — ONDANSETRON 4 MG: 4 TABLET, ORALLY DISINTEGRATING ORAL at 10:33

## 2021-12-24 RX ADMIN — ACETAMINOPHEN 650 MG: 325 TABLET, FILM COATED ORAL at 12:11

## 2021-12-24 RX ADMIN — IBUPROFEN 600 MG: 600 TABLET, FILM COATED ORAL at 12:11

## 2021-12-24 RX ADMIN — LIDOCAINE HYDROCHLORIDE 30 ML: 20 SOLUTION OROPHARYNGEAL at 10:59

## 2021-12-24 ASSESSMENT — FIBROSIS 4 INDEX: FIB4 SCORE: 0.42

## 2021-12-24 NOTE — ED NOTES
Pharmacy Medication Reconciliation    ~Medication Reconciliation updated and complete per patient at bedside  ~Allergies reviewed and updated           ~Patient reports NO Prescription or OTC medications

## 2021-12-24 NOTE — LETTER
12/26/2021               Anais Larsen  [address] [place] [address] [place] [address]        Dear Anais (MR#[id number])    This letter is sent in regards to your recent visit to the [facility] Emergency Department on 12/24/2021. During the visit, tests were performed to assist the physician in your medical diagnosis. A review of your tests requires that we notify you of the following:    Your urine culture was POSITIVE for a bacteria called Escherichia coli. You were not treated for this as you had no symptoms in the ER. If you develop any symptoms please feel free to contact me at the number below. I'm happy to discuss any questions or concerns. Thank you for your cooperation in the matter.    Sincerely,  ED Culture Follow-Up Staff  Maximilian Quiroga, PharmD    [facility], Emergency Department  [address] [place] [id number] [phone] (ED Culture Line)

## 2021-12-24 NOTE — ED TRIAGE NOTES
"Chief Complaint   Patient presents with   • Abdominal Pain     Pt stated \"I got food poisoning a few days ago\", c/o mid-epigastric pain   • N/V     /78   Pulse (!) 54   Temp 36.6 °C (97.8 °F) (Temporal)   Resp 15   Ht 1.6 m (5' 3\")   Wt 60.1 kg (132 lb 7.9 oz)   LMP 12/19/2021   SpO2 98%   BMI 23.47 kg/m²     Has this patient been vaccinated for COVID NO  If not, would they like to be vaccinated while in the ER if eligible?  NO  Would the patient like to speak with the ERP about the possibility of receiving the COVID vaccine today before making a decision? NO    Pt ambulated to ED by self for c/o abd pain and N/V, states ate some conner a couple of days ago \"that may have been bad.\"      Pt encouraged to keep mask in place while in ED.        "

## 2021-12-24 NOTE — ED PROVIDER NOTES
"ED Provider Note    Scribed for Devan Mckeon M.D. by Zita Taveras. 12/24/2021  10:17 AM    Primary care provider: TAMMY Prajapati  Means of arrival: Walk in  History obtained from: Patient  History limited by: None    CHIEF COMPLAINT  Chief Complaint   Patient presents with    Abdominal Pain     Pt stated \"I got food poisoning a few days ago\", c/o mid-epigastric pain    N/V       HPI  Anais Larsen is a 21 y.o. female who presents to the Emergency Department for ongoing nausea, vomiting, and diarrhea with an onset of several days ago. Her emesis and stool are non bloody or bilious. Patient states she may have gotten food poisoning a few days ago. She has not been able to keep any liquid or food down the past 24 hours. She reports associated mid-epigastric abdominal pain. She denies any associated fever, chills, or cough. Exacerbating factors include eating or drinking. No alleviating factors were identified. She denies any possible pregnancy. Patient also denies any past abdominal surgeries or daily medications.     REVIEW OF SYSTEMS  Pertinent positives include nausea, vomiting, diarrhea, and abdominal pain.   Pertinent negatives include no fever, chills, or cough.    All other systems reviewed and negative. See HPI for further details.     PAST MEDICAL HISTORY   has a past medical history of Miscarriage and Syphilis.    SURGICAL HISTORY  patient denies any surgical history    SOCIAL HISTORY  Social History     Tobacco Use    Smoking status: Never Smoker    Smokeless tobacco: Never Used   Vaping Use    Vaping Use: Never used   Substance Use Topics    Alcohol use: Yes    Drug use: Yes     Types: Inhaled     Comment: Marijuana      Social History     Substance and Sexual Activity   Drug Use Yes    Types: Inhaled    Comment: Marijuana       FAMILY HISTORY  Family History   Problem Relation Age of Onset    Hypertension Father     Hyperlipidemia Father     Heart Disease Paternal Grandfather     Cancer Other " "        breast Ca       CURRENT MEDICATIONS  None    ALLERGIES  Allergies   Allergen Reactions    Penicillin G Rash     rash    Sulfa Drugs Rash     Rash         PHYSICAL EXAM  VITAL SIGNS: /78   Pulse (!) 54   Temp 36.6 °C (97.8 °F) (Temporal)   Resp 15   Ht 1.6 m (5' 3\")   Wt 60.1 kg (132 lb 7.9 oz)   LMP 12/19/2021   SpO2 98%   BMI 23.47 kg/m²     Nursing note and vitals reviewed.  Constitutional: Well-developed and well-nourished. No distress.   HENT: Head is normocephalic and atraumatic. Oropharynx is clear and moist without exudate or erythema.   Eyes: Pupils are equal, round, and reactive to light. Conjunctiva are normal.   Cardiovascular: Normal rate and regular rhythm. No murmur heard. Normal radial pulses.  Pulmonary/Chest: Breath sounds normal. No wheezes or rales.   Abdominal: Mild epigastric tenderness to palpation. Soft. No distention. No rebound. No guarding.     Musculoskeletal: Extremities exhibit normal range of motion without edema or tenderness.   Neurological: Awake, alert and oriented to person, place, and time. No focal deficits noted.  Skin: Skin is warm and dry. No rash.   Psychiatric: Normal mood and affect. Appropriate for clinical situation.    DIAGNOSTIC STUDIES / PROCEDURES    LABS  Results for orders placed or performed during the hospital encounter of 12/24/21   BETA-HCG QUALITATIVE URINE   Result Value Ref Range    Beta-Hcg Urine Negative Negative   URINALYSIS,CULTURE IF INDICATED    Specimen: Urine, Clean Catch   Result Value Ref Range    Color Yellow     Character Clear     Specific Gravity 1.020 <1.035    Ph 8.0 5.0 - 8.0    Glucose Negative Negative mg/dL    Ketones >=80 (A) Negative mg/dL    Protein Negative Negative mg/dL    Bilirubin Negative Negative    Nitrite Positive (A) Negative    Leukocyte Esterase Negative Negative    Occult Blood Negative Negative    Micro Urine Req Microscopic    URINE MICROSCOPIC (W/UA)   Result Value Ref Range    WBC 0-2 /hpf    RBC " 0-2 /hpf    Bacteria Moderate (A) None /hpf    Epithelial Cells Few Few /hpf    Mucous Threads Few /hpf    Urine Crystals Few Amorphous /hpf   CBC WITH DIFFERENTIAL   Result Value Ref Range    WBC 10.0 4.8 - 10.8 K/uL    RBC 4.97 4.20 - 5.40 M/uL    Hemoglobin 15.7 12.0 - 16.0 g/dL    Hematocrit 46.1 37.0 - 47.0 %    MCV 92.8 81.4 - 97.8 fL    MCH 31.6 27.0 - 33.0 pg    MCHC 34.1 33.6 - 35.0 g/dL    RDW 40.1 35.9 - 50.0 fL    Platelet Count 269 164 - 446 K/uL    MPV 9.9 9.0 - 12.9 fL    Neutrophils-Polys 80.80 (H) 44.00 - 72.00 %    Lymphocytes 13.70 (L) 22.00 - 41.00 %    Monocytes 4.80 0.00 - 13.40 %    Eosinophils 0.10 0.00 - 6.90 %    Basophils 0.30 0.00 - 1.80 %    Immature Granulocytes 0.30 0.00 - 0.90 %    Nucleated RBC 0.00 /100 WBC    Neutrophils (Absolute) 8.08 (H) 2.00 - 7.15 K/uL    Lymphs (Absolute) 1.37 1.00 - 4.80 K/uL    Monos (Absolute) 0.48 0.00 - 0.85 K/uL    Eos (Absolute) 0.01 0.00 - 0.51 K/uL    Baso (Absolute) 0.03 0.00 - 0.12 K/uL    Immature Granulocytes (abs) 0.03 0.00 - 0.11 K/uL    NRBC (Absolute) 0.00 K/uL   COMP METABOLIC PANEL   Result Value Ref Range    Sodium 141 135 - 145 mmol/L    Potassium 3.7 3.6 - 5.5 mmol/L    Chloride 103 96 - 112 mmol/L    Co2 24 20 - 33 mmol/L    Anion Gap 14.0 7.0 - 16.0    Glucose 108 (H) 65 - 99 mg/dL    Bun 14 8 - 22 mg/dL    Creatinine 0.87 0.50 - 1.40 mg/dL    Calcium 9.4 8.4 - 10.2 mg/dL    AST(SGOT) 15 12 - 45 U/L    ALT(SGPT) 15 2 - 50 U/L    Alkaline Phosphatase 87 30 - 99 U/L    Total Bilirubin 0.6 0.1 - 1.5 mg/dL    Albumin 4.4 3.2 - 4.9 g/dL    Total Protein 7.5 6.0 - 8.2 g/dL    Globulin 3.1 1.9 - 3.5 g/dL    A-G Ratio 1.4 g/dL   LIPASE   Result Value Ref Range    Lipase 23 7 - 58 U/L   ESTIMATED GFR   Result Value Ref Range    GFR If African American >60 >60 mL/min/1.73 m 2    GFR If Non African American >60 >60 mL/min/1.73 m 2     All labs reviewed by me.    COURSE & MEDICAL DECISION MAKING  Nursing notes, VS, PMSFHx reviewed in chart.      10:17 AM - Patient seen and examined at bedside. Patient will be treated with GI Cocktail 30 ml oral suspension, and Zofran 4 mg tab for her nausea and vomiting. Ordered UA, Beta-HCG, and Urine Microscopic to evaluate her symptoms. The differential diagnoses include but are not limited to: viral syndrome, electrolyte abnormality, pregnancy. Discussed plan of care with the patient. I informed her I will obtain lab studies to evaluate her symptoms. She will also be treated with nausea medications for her nausea and vomiting. Patient is understanding and agreeable to plan.    11:18 AM - Ordered Lipase, CMP, CBC with diff to further evaluate. She will be resuscitated with D5 NS infusion 1000 ml.     12:04 PM - Patient was reevaluated at bedside. She is resting in bed feeling mildly improved. Discussed lab results with the patient and informed them that they were reassuring. She will be treated with Motrin 600 mg and Tylenol 650 mg for pain prior to discharge home. The patient will return for new or worsening symptoms and is stable at the time of discharge. Patient verbalizes understanding and agreement to this plan of care.      HYDRATION: Based on the patient's presentation of Dehydration the patient was given IV fluids. IV Hydration was used because oral hydration was not adequate alone. Upon recheck following hydration, the patient was improved.     The patient is referred to a primary physician for blood pressure management, diabetic screening, and for all other preventative health concerns.    DISPOSITION:  Patient will be discharged home in stable condition.    FOLLOW UP:  TAMMY Prajapati  [address] [place] [id number] [phone]    Schedule an appointment as soon as possible for a visit       [facility], Emergency Dept  [address]  [place] [id number] [phone]    If symptoms worsen      OUTPATIENT MEDICATIONS:  New Prescriptions    ONDANSETRON (ZOFRAN ODT)  4 MG TABLET DISPERSIBLE    Take 1 Tablet by mouth every 8 hours as needed.     FINAL IMPRESSION  1. Nausea vomiting and diarrhea    2. Epigastric pain          I, Zita Taveras (Scribe), am scribing for, and in the presence of, Devan Mckeon M.D..    Electronically signed by: Zita Taveras (Scribe), 12/24/2021    IDevan M.D. personally performed the services described in this documentation, as scribed by Zita Taveras in my presence, and it is both accurate and complete.    C    The note accurately reflects work and decisions made by me.  Devan Mckeon M.D.  12/24/2021  2:53 PM

## 2021-12-26 LAB
BACTERIA UR CULT: ABNORMAL
BACTERIA UR CULT: ABNORMAL
SIGNIFICANT IND 70042: ABNORMAL
SITE SITE: ABNORMAL
SOURCE SOURCE: ABNORMAL

## 2021-12-26 NOTE — ED NOTES
"ED Positive Culture Follow-up/Notification Note:    Date: 12/26/21     Patient seen in the ED on 12/24/2021 for nausea, vomiting, and diarrhea due to self diagnosed food poisoning. She denied any associated fever, chills, or cough.   1. Nausea vomiting and diarrhea    2. Epigastric pain       Discharge Medication List as of 12/24/2021 12:50 PM      START taking these medications    Details   ondansetron (ZOFRAN ODT) 4 MG TABLET DISPERSIBLE Take 1 Tablet by mouth every 8 hours as needed., Disp-10 Tablet, R-0, Normal             Allergies: Penicillin g and Sulfa drugs     Vitals:    12/24/21 0946 12/24/21 1117 12/24/21 1259   BP: 144/78 149/71 140/70   Pulse: (!) 54 (!) 46 (!) 55   Resp: 15 18 18   Temp: 36.6 °C (97.8 °F) 36.6 °C (97.8 °F) 36.6 °C (97.8 °F)   TempSrc: Temporal Temporal Temporal   SpO2: 98% 99% 99%   Weight: 60.1 kg (132 lb 7.9 oz)     Height: 1.6 m (5' 3\")         Final cultures:   Results     Procedure Component Value Units Date/Time    URINE CULTURE(NEW) [[id number]]  (Abnormal)  (Susceptibility) Collected: 12/24/21 1034    Order Status: Completed Specimen: Urine, Clean Catch Updated: 12/26/21 1012     Significant Indicator POS     Source UR     Site URINE, CLEAN CATCH     Culture Result -      Escherichia coli  >100,000 cfu/mL      Narrative:      Collected By:  Indication for culture:->Acute unexplained altered mental  status ONLY after ruling out other recognized cause  Collected By:    Susceptibility     Escherichia coli (1)     Antibiotic Interpretation Microscan   Method Status    Amikacin  [*]  Sensitive <=16 mcg/mL DYLAN Final    Ampicillin Sensitive <=8 mcg/mL DYLAN Final    Amoxicillin/CA  [*]  Sensitive <=8/4 mcg/mL DYLAN Final    Aztreonam  [*]  Sensitive <=4 mcg/mL DYLAN Final    Ceftriaxone Sensitive <=1 mcg/mL DYLAN Final    Ceftazidime  [*]  Sensitive <=1 mcg/mL DYLAN Final    Cefazolin Sensitive <=2 mcg/mL DYLAN Final    Ciprofloxacin Sensitive <=0.25 mcg/mL DYLAN Final    Cefepime Sensitive <=2 " mcg/mL DYLAN Final    Cefuroxime Sensitive 8 mcg/mL DYLAN Final    Ertapenem  [*]  Sensitive <=0.5 mcg/mL DYLAN Final    Nitrofurantoin Sensitive <=32 mcg/mL DYLAN Final    Ampicillin/sulbactam Sensitive <=4/2 mcg/mL DYLAN Final    Gentamicin Sensitive <=2 mcg/mL DYLAN Final    Meropenem/Vaborbactam  [*]  Sensitive <=2 mcg/mL DYLAN Final    Levofloxacin Sensitive <=0.5 mcg/mL DYLAN Final    Meropenem  [*]  Sensitive <=1 mcg/mL DYLAN Final    Minocycline Sensitive <=4 mcg/mL DYLAN Final    Pip/Tazobactam Sensitive <=8 mcg/mL DYLAN Final    Trimeth/Sulfa Sensitive 1/19 mcg/mL DYLAN Final    Tetracycline  [*]  Sensitive <=4 mcg/mL DYLAN Final    Tigecycline Sensitive <=2 mcg/mL DYLAN Final    Tobramycin Sensitive <=2 mcg/mL DYLAN Final    Ceftolozane+Tazobactam  [*]  Sensitive <=2 mcg/mL DYLAN Final    Ceftazidime+Avibactam  [*]  Sensitive <=4 mcg/mL DYLAN Final    Imipenem  [*]  Sensitive <=1 mcg/mL DYLAN Final           [*]  Suppressed Antibiotic                 URINALYSIS,CULTURE IF INDICATED [[id number]]  (Abnormal) Collected: 12/24/21 1034    Order Status: Completed Specimen: Urine, Clean Catch Updated: 12/24/21 1057     Color Yellow     Character Clear     Specific Gravity 1.020     Ph 8.0     Glucose Negative mg/dL      Ketones >=80 mg/dL      Protein Negative mg/dL      Bilirubin Negative     Nitrite Positive     Leukocyte Esterase Negative     Occult Blood Negative     Micro Urine Req Microscopic    Narrative:      Collected By:  Indication for culture:->Patient WITHOUT an indwelling Farris  catheter in place with new onset of Dysuria, Frequency,  Urgency, and/or Suprapubic pain          Plan:   Called patient to assess for any symptoms of a UTI. Unable to leave LVM. Patient on ER note patient did not have any acute symptoms of a UTI. No change in therapy is required.     Send My chart message to inform patient of results and requested a call back if she has developed any urinary symptoms.     Maximilian Quiroga, PharmD

## 2023-01-03 ENCOUNTER — TELEPHONE (OUTPATIENT)
Dept: SCHEDULING | Facility: IMAGING CENTER | Age: 23
End: 2023-01-03

## 2023-02-27 ENCOUNTER — NON-PROVIDER VISIT (OUTPATIENT)
Dept: OCCUPATIONAL MEDICINE | Facility: CLINIC | Age: 23
End: 2023-02-27

## 2023-02-27 DIAGNOSIS — Z02.1 PRE-EMPLOYMENT DRUG SCREENING: ICD-10-CM

## 2023-02-27 LAB
AMP AMPHETAMINE: NORMAL
BAR BARBITURATES: NORMAL
BZO BENZODIAZEPINES: NORMAL
COC COCAINE: NORMAL
INT CON NEG: NORMAL
INT CON POS: NORMAL
MDMA ECSTASY: NORMAL
MET METHAMPHETAMINES: NORMAL
MTD METHADONE: NORMAL
OPI OPIATES: NORMAL
OXY OXYCODONE: NORMAL
PCP PHENCYCLIDINE: NORMAL
POC URINE DRUG SCREEN OCDRS: NEGATIVE
THC: NORMAL

## 2023-02-27 PROCEDURE — 80305 DRUG TEST PRSMV DIR OPT OBS: CPT | Performed by: NURSE PRACTITIONER

## 2023-07-29 ENCOUNTER — HOSPITAL ENCOUNTER (EMERGENCY)
Facility: MEDICAL CENTER | Age: 23
End: 2023-07-29
Attending: EMERGENCY MEDICINE
Payer: COMMERCIAL

## 2023-07-29 VITALS
BODY MASS INDEX: 25.03 KG/M2 | HEART RATE: 81 BPM | RESPIRATION RATE: 18 BRPM | OXYGEN SATURATION: 97 % | TEMPERATURE: 98 F | WEIGHT: 136.02 LBS | DIASTOLIC BLOOD PRESSURE: 79 MMHG | SYSTOLIC BLOOD PRESSURE: 121 MMHG | HEIGHT: 62 IN

## 2023-07-29 DIAGNOSIS — K04.7 DENTAL INFECTION: ICD-10-CM

## 2023-07-29 PROCEDURE — 700102 HCHG RX REV CODE 250 W/ 637 OVERRIDE(OP): Performed by: EMERGENCY MEDICINE

## 2023-07-29 PROCEDURE — 99283 EMERGENCY DEPT VISIT LOW MDM: CPT

## 2023-07-29 PROCEDURE — A9270 NON-COVERED ITEM OR SERVICE: HCPCS | Performed by: EMERGENCY MEDICINE

## 2023-07-29 RX ORDER — CLINDAMYCIN HYDROCHLORIDE 300 MG/1
300 CAPSULE ORAL 3 TIMES DAILY
Qty: 21 CAPSULE | Refills: 0 | Status: ACTIVE | OUTPATIENT
Start: 2023-07-29 | End: 2023-08-05

## 2023-07-29 RX ORDER — HYDROCODONE BITARTRATE AND ACETAMINOPHEN 5; 325 MG/1; MG/1
1 TABLET ORAL ONCE
Status: COMPLETED | OUTPATIENT
Start: 2023-07-29 | End: 2023-07-29

## 2023-07-29 RX ORDER — CLINDAMYCIN HYDROCHLORIDE 150 MG/1
300 CAPSULE ORAL ONCE
Status: COMPLETED | OUTPATIENT
Start: 2023-07-29 | End: 2023-07-29

## 2023-07-29 RX ORDER — IBUPROFEN 600 MG/1
600 TABLET ORAL EVERY 6 HOURS PRN
Qty: 20 TABLET | Refills: 0 | Status: SHIPPED | OUTPATIENT
Start: 2023-07-29

## 2023-07-29 RX ADMIN — HYDROCODONE BITARTRATE AND ACETAMINOPHEN 1 TABLET: 5; 325 TABLET ORAL at 20:17

## 2023-07-29 RX ADMIN — CLINDAMYCIN HYDROCHLORIDE 300 MG: 150 CAPSULE ORAL at 20:17

## 2023-07-29 ASSESSMENT — FIBROSIS 4 INDEX: FIB4 SCORE: 0.35

## 2023-07-29 ASSESSMENT — PAIN DESCRIPTION - PAIN TYPE: TYPE: ACUTE PAIN

## 2023-07-30 NOTE — ED PROVIDER NOTES
Emergency Physician Note    Chief Concern:  Chief Complaint   Patient presents with    Dental Pain    Headache    Ear Pain     21 yo female ambulates to triage with reports of right upper dental pain that has gotten worse over the past 2 days and now patient has a lump in the back right side of her hard palate.  Reports right ear pain and headache as well.  Reports she feels hot but has not checked for a temperature.      HPI/ROS     External Records Reviewed:  Inpatient Notes Ms. Larsen was seen at [facility] emergency department 6/6/2022, physician assistant note reviewed from her visit to that facility.  She was seen for evaluation of near syncope, also reported vaginal bleeding for 1 month.  Hemoglobin at that visit was 15.2.  Transvaginal ultrasound was within normal limits.  She was discharged in stable condition.    HPI:  Anais Larsen is a 22 y.o. female who presents to the emergency department today for evaluation of dental pain and intraoral pain.  She has a decayed upper molar on the right side, states that she accidentally closed her mouth quickly and bit down causing some pain localized to the right upper molar.  She had persistent pain localized to the tooth, she then developed some pain localized to the roof of her mouth in the same area with some radiation to the right ear.  She has not had any associated fevers.  She does have an appointment scheduled with her dentist, however the pain began to worsen prompting her to come to the emergency department today.  She reports no difficulty swallowing, no difficulty breathing, no sensation of throat swelling.  She reports no drainage or discharge from the right ear.  No history of impaired immunity.    PAST MEDICAL HISTORY  Past Medical History:   Diagnosis Date    Miscarriage     Syphilis        SURGICAL HISTORY  History reviewed. No pertinent surgical history.    FAMILY HISTORY  Family History   Problem Relation Age of Onset    Hypertension  "Father     Hyperlipidemia Father     Heart Disease Paternal Grandfather     Cancer Other         breast Ca       SOCIAL HISTORY   reports that she has never smoked. She has never used smokeless tobacco. She reports that she does not currently use alcohol. She reports current drug use. Drug: Inhaled.    CURRENT MEDICATIONS  Previous Medications    ONDANSETRON (ZOFRAN ODT) 4 MG TABLET DISPERSIBLE    Take 1 Tablet by mouth every 8 hours as needed.       ALLERGIES  Penicillin g and Sulfa drugs    PHYSICAL EXAM  Vital Signs: /80   Pulse 86   Temp 36.8 °C (98.3 °F) (Temporal)   Resp 16   Ht 1.575 m (5' 2\")   Wt 61.7 kg (136 lb 0.4 oz)   LMP 07/21/2023 (Approximate)   SpO2 97%   Breastfeeding No   BMI 24.88 kg/m²   Constitutional: Alert, no acute distress  HEENT: Right external auditory canal and tympanic membrane are normal-appearing, no evidence of otitis media or otitis externa.  Right upper molar with significant decay.  There is a small reddened area to the right hard palate suggestive of an early infection, no swelling, nor fluctuance to suggest abscess.  Eyes: Normal conjunctiva  Neck: Supple, normal range of motion, no lymphadenopathy  Cardiovascular: Extremities are warm and well perfused, no murmur appreciated, normal cardiac auscultation  Pulmonary: No respiratory distress, normal work of breathing, no accessory muscle usage, breath sounds clear and equal bilaterally  Psychiatric: Normal and appropriate mood and affect     Course and Medical Decision Making    ED Observation Status? No; Patient does not meet criteria for ED Observation.     Initial Assessment and Plan    Ms. Sanchez presents to the emergency department today for evaluation of dental pain, as well as some pain to the roof of the mouth radiating to the ear.  Physical exam is less concerning for otitis media or otitis externa.  There is a small area of redness on the hard palate suggestive of an early infection, this may represent an " early odontogenic infection, or spread from an early odontogenic infection.  I do not appreciate any fluctuance or swelling, no evidence of abscess amenable to drainage.  Her vital signs are reassuring with no tachycardia, no hypotension.  She is very well-appearing without evidence of airway compromise.  History and physical exam are less concerning for retropharyngeal abscess, or peritonsillar abscess.  Plan at this time is for treatment, I will use clindamycin as she has an allergy to penicillin.  She was given a single dose of hydrocodone in the emergency department, discharged with a prescription for ibuprofen.  She already has a dentist appointment scheduled for follow-up. Return precautions were discussed with the patient, and provided in written form with the patient's discharge instructions.     Additional Problems and Disposition    Disposition:  The patient will return for new or worsening symptoms and is stable at the time of discharge.    The patient is referred to a primary physician for blood pressure management, diabetic screening, and for all other preventative health concerns.    Patient will be discharged home in stable condition.    FOLLOW UP:  Lui London, P.A.  [address] [facility] [id number] [phone]    Schedule an appointment as soon as possible for a visit   As needed    [facility], Emergency Dept  [address]  [facility] [id number] [phone]  Go to   If symptoms worsen      OUTPATIENT MEDICATIONS:  New Prescriptions    CLINDAMYCIN (CLEOCIN) 300 MG CAP    Take 1 Capsule by mouth 3 times a day for 7 days.    IBUPROFEN (MOTRIN) 600 MG TAB    Take 1 Tablet by mouth every 6 hours as needed for Moderate Pain.        FINAL IMPRESSION   1. Dental infection       Rosmery MARQUEZ (Elana), am scribing for, and in the presence of, Park Larsen M.D..    Electronically signed by: Rosmery Boyce), 7/29/2023    Park MARQUEZ M.D. personally performed  the services described in this documentation, as scribed by Rosmery Timmons in my presence, and it is both accurate and complete.     The note accurately reflects work and decisions made by me.  Park Larsen M.D.  7/29/2023  10:23 PM

## 2023-07-30 NOTE — ED NOTES
ERP at bedside. Pt agrees with plan of care discussed by ERP. Barry in low position, side rail up for pt safety. Call light within reach. Continued to monitor

## 2023-07-30 NOTE — DISCHARGE INSTRUCTIONS
Please keep your scheduled follow-up appointment with your dentist.  Return to the emergency department if you develop any new or worsening symptoms including worsening pain, facial swelling, fevers, swelling in the mouth or back of the throat, or if you have any further concerns.

## 2023-07-30 NOTE — ED TRIAGE NOTES
"Chief Complaint   Patient presents with    Dental Pain    Headache    Ear Pain     21 yo female ambulates to triage with reports of right upper dental pain that has gotten worse over the past 2 days and now patient has a lump in the back right side of her hard palate.  Reports right ear pain and headache as well.  Reports she feels hot but has not checked for a temperature.     /80   Pulse 86   Temp 36.8 °C (98.3 °F) (Temporal)   Resp 16   Ht 1.575 m (5' 2\")   Wt 61.7 kg (136 lb 0.4 oz)   LMP 07/21/2023 (Approximate)   SpO2 97%   Breastfeeding No   BMI 24.88 kg/m²    "

## 2023-07-30 NOTE — ED NOTES
Discharge instructions provided. Pt verbalized understanding of discharge instructions to follow up with dentist and PCP and to return to ER if condition worsens. Pt ambulated out of ER without difficulty.

## 2024-07-16 ENCOUNTER — HOSPITAL ENCOUNTER (EMERGENCY)
Facility: MEDICAL CENTER | Age: 24
End: 2024-07-16
Attending: STUDENT IN AN ORGANIZED HEALTH CARE EDUCATION/TRAINING PROGRAM
Payer: COMMERCIAL

## 2024-07-16 VITALS
SYSTOLIC BLOOD PRESSURE: 127 MMHG | HEART RATE: 109 BPM | DIASTOLIC BLOOD PRESSURE: 81 MMHG | HEIGHT: 63 IN | RESPIRATION RATE: 18 BRPM | OXYGEN SATURATION: 94 % | BODY MASS INDEX: 25.34 KG/M2 | WEIGHT: 143 LBS | TEMPERATURE: 98.3 F

## 2024-07-16 DIAGNOSIS — R42 LIGHTHEADEDNESS: ICD-10-CM

## 2024-07-16 DIAGNOSIS — F10.920 ALCOHOLIC INTOXICATION WITHOUT COMPLICATION (HCC): ICD-10-CM

## 2024-07-16 LAB — EKG IMPRESSION: NORMAL

## 2024-07-16 PROCEDURE — 93005 ELECTROCARDIOGRAM TRACING: CPT | Performed by: STUDENT IN AN ORGANIZED HEALTH CARE EDUCATION/TRAINING PROGRAM

## 2024-07-16 PROCEDURE — 99284 EMERGENCY DEPT VISIT MOD MDM: CPT

## 2024-07-16 ASSESSMENT — FIBROSIS 4 INDEX: FIB4 SCORE: 0.44

## 2024-07-16 NOTE — ED TRIAGE NOTES
Chief Complaint   Patient presents with    Anxiety     BIB ems from home. Pt had a break up with her fiance and was experiencing intense anxiety. Alcohol was involved, pt reports having 7 shots.      Vitals:    07/16/24 0105   BP: 127/81   Pulse: (!) 109   Resp: 18   Temp: 36.8 °C (98.3 °F)   SpO2: 94%     En route patient received 5mg Haldol, . 5mg versed    Pt reports she wants to leave and not wait for an MD  
0 = understands/communicates without difficulty

## 2024-07-16 NOTE — DISCHARGE INSTRUCTIONS
You were seen in the emergency department for severe alcohol intoxication.     If you are interested in abstaining from alcohol, [facility] is an excellent local resource which can help you detox from alcohol in a safe way.     Completely abstaining from alcohol can be dangerous and even life-threatening.     [facility]  [address] [place] [address]  [phone]

## 2024-07-16 NOTE — ED PROVIDER NOTES
"ED Provider Note    CHIEF COMPLAINT  Chief Complaint   Patient presents with    Anxiety     BIB ems from home. Pt had a break up with her fiance and was experiencing intense anxiety. Alcohol was involved, pt reports having 7 shots.        EXTERNAL RECORDS REVIEWED  OB/GYN note from 2019 reviewed for medical history    HPI/ROS  LIMITATION TO HISTORY   Select: : None  OUTSIDE HISTORIAN(S):  Patient's fiancé at bedside providing collateral history    Anais Larsen is a 23 y.o. female with past medical history of anxiety presenting to the emergency department for lightheadedness.  Patient reportedly was drinking heavily this evening, had to buzz balls and 3 \"99's\", was having an argument with her fiancé.  Fiancé says that she was hyperventilating then felt like she was going to pass out.  He called 911, when they arrived she was somewhat agitated so she was given Haldol.  In the emergency department now, patient says that she wants to go home.  She has no complaints.  She just says that she is feeling drunk, says that she has work in the morning has to go get some sleep.    PAST MEDICAL HISTORY   has a past medical history of Miscarriage and Syphilis.    SURGICAL HISTORY  patient denies any surgical history    FAMILY HISTORY  Family History   Problem Relation Age of Onset    Hypertension Father     Hyperlipidemia Father     Heart Disease Paternal Grandfather     Cancer Other         breast Ca       SOCIAL HISTORY  Social History     Tobacco Use    Smoking status: Never    Smokeless tobacco: Never   Vaping Use    Vaping status: Never Used   Substance and Sexual Activity    Alcohol use: Yes     Comment: 7 drinks/night    Drug use: Yes     Types: Inhaled     Comment: Marijuana    Sexual activity: Yes     Partners: Male     Comment: contraception        CURRENT MEDICATIONS  Home Medications       Reviewed by Darío Chandler R.N. (Registered Nurse) on 07/16/24 at 0110  Med List Status: Not Addressed     Medication " "Last Dose Status   ibuprofen (MOTRIN) 600 MG Tab  Active   ondansetron (ZOFRAN ODT) 4 MG TABLET DISPERSIBLE  Active                    ALLERGIES  Allergies   Allergen Reactions    Penicillin G Rash     rash    Sulfa Drugs Rash     Rash         PHYSICAL EXAM  VITAL SIGNS: /81   Pulse (!) 109   Temp 36.8 °C (98.3 °F) (Temporal)   Resp 18   Ht 1.6 m (5' 3\")   Wt 64.9 kg (143 lb)   SpO2 94%   BMI 25.33 kg/m²    General: non-toxic, no acute distress, inebriated, slightly slurring speech, smells of alcohol but answering questions appropriately  Neuro: oriented x 3, moving all extremities.   HEENT:   - Head: Normocephalic, atraumatic  - Eyes: PERRL  - Ears/Nose: normal external nose and ears  - Mouth: moist mucosal membranes  Resp: clear to auscultation, no increased work of breathing  CV: Regular rate and rhythm  Abd: Soft, non-tender, non-distended  Extremities: No peripheral edema  Psych: lucid and conversational, not agitated or aggressive        DIAGNOSTIC STUDIES / PROCEDURES    EKG  My independent EKG interpretation:  Results for orders placed or performed during the hospital encounter of 24   EKG   Result Value Ref Range    Report       [facility] Emergency Dept.    Test Date:  2024  Pt Name:    PAIGE BECKER                   Department: ER  MRN:        [id number]                      Room:       [facility]  Gender:     Female                       Technician: [id number]  :        2000                   Requested By:HENRIQUE REY  Order #:    [id number]                    Reading MD: Henrique Rey    Measurements  Intervals                                Axis  Rate:       97                           P:          51  KS:         160                          QRS:        56  QRSD:       93                           T:          33  QT:         358  QTc:        455    Interpretive Statements  Sinus rhythm  Probable left atrial enlargement  No previous ECG available for " comparison  Electronically Signed On 2024 01:28:35 PDT by Beulah Rey         LABS  Results for orders placed or performed during the hospital encounter of 24   EKG   Result Value Ref Range    Report       [facility] Emergency Dept.    Test Date:  2024  Pt Name:    PAIGE BECKER                   Department: ER  MRN:        [id number]                      Room:       [facility]  Gender:     Female                       Technician: [id number]  :        2000                   Requested By:BEULAH REY  Order #:    [id number]                    Reading MD: Beulah Rey    Measurements  Intervals                                Axis  Rate:       97                           P:          51  ND:         160                          QRS:        56  QRSD:       93                           T:          33  QT:         358  QTc:        455    Interpretive Statements  Sinus rhythm  Probable left atrial enlargement  No previous ECG available for comparison  Electronically Signed On 2024 01:28:35 PDT by Beulah Rey         RADIOLOGY  I have independently interpreted the diagnostic imaging associated with this visit and am waiting the final reading from the radiologist.   My preliminary interpretation is as follows:   -   Radiologist interpretation:   No orders to display           MEDICAL DECISION MAKING      ED COURSE AND PLAN    Paige Becker is a 23 y.o. female presenting to the emergency department for an episode of lightheadedness, reportedly was in a verbal altercation with her fiancé when she began to hyperventilate and felt like she was going to pass out.     On arrival to the emergency department patient is borderline tachycardic but vital signs otherwise stable, tachycardia resolved without intervention while I was talking to her in the room, heart rate in the mid 90s.  She has no complaints, she says that she wants to go home to get some sleep.  I did convince her to stay for an  EKG which showed no evidence of dysrhythmia, interval abnormalities or ischemic changes to explain her episode of lightheadedness.    Consider the possibility of ectopic pregnancy as the cause of her lightheadedness versus anemia versus electrolyte derangement however patient did not want to stay for labs.  She is inebriated but seems to have capacity make her own medical decisions at this time.  Her last menstrual period was approximately 3 weeks ago.  She has no abdominal complaints.  Do not feel that labs or any imaging are strictly necessary at this time.  She is ambulatory under her own accord patient is appropriate for discharge home, strict return precautions discussed.      ---Pertinent ED Course---:    1:25 AM I reviewed the patient's old records in Epic, medication list, allergies, past medical history and performed a physical examination.         Procedures:      ----------------------------------------------------------------------------------  DISCUSSIONS    I have discussed management of the patient with the following physicians and BULMARO's:      Discussion of management with other [facility] or appropriate source(s):     Escalation of care considered, and ultimately not performed: Consider obtaining labs, urinalysis as described above    Barriers to care at this time, including but not limited to:     Decision tools and prescription drugs considered including, but not limited to:     FINAL IMPRESSION    1. Alcoholic intoxication without complication (HCC)    2. Lightheadedness        New Prescriptions    No medications on file         DISPOSITION    Discharge home, Stable      This chart was dictated using an electronic voice recognition software. The chart has been reviewed and edited but there is still possibility for dictation errors due to limitation of software.    Henrique Rey, DO 7/16/2024

## 2024-07-16 NOTE — ED NOTES
Pt refused to wait for d/c paperwork and to sign paperwork. Pt ambulatory to the lobby with dianna

## 2024-07-17 ENCOUNTER — HOSPITAL ENCOUNTER (EMERGENCY)
Facility: MEDICAL CENTER | Age: 24
End: 2024-07-17
Attending: EMERGENCY MEDICINE
Payer: COMMERCIAL

## 2024-07-17 VITALS
SYSTOLIC BLOOD PRESSURE: 139 MMHG | BODY MASS INDEX: 25.38 KG/M2 | RESPIRATION RATE: 16 BRPM | HEART RATE: 88 BPM | WEIGHT: 143.3 LBS | TEMPERATURE: 98.2 F | DIASTOLIC BLOOD PRESSURE: 96 MMHG | OXYGEN SATURATION: 97 %

## 2024-07-17 DIAGNOSIS — M43.6 TORTICOLLIS: ICD-10-CM

## 2024-07-17 PROCEDURE — 20552 NJX 1/MLT TRIGGER POINT 1/2: CPT

## 2024-07-17 PROCEDURE — 700111 HCHG RX REV CODE 636 W/ 250 OVERRIDE (IP): Mod: JZ | Performed by: EMERGENCY MEDICINE

## 2024-07-17 PROCEDURE — 99283 EMERGENCY DEPT VISIT LOW MDM: CPT | Mod: 25

## 2024-07-17 PROCEDURE — 96372 THER/PROPH/DIAG INJ SC/IM: CPT | Mod: XU

## 2024-07-17 RX ORDER — DIPHENHYDRAMINE HCL 25 MG
25 CAPSULE ORAL EVERY 6 HOURS PRN
Qty: 30 CAPSULE | Refills: 0 | Status: SHIPPED | OUTPATIENT
Start: 2024-07-17 | End: 2024-08-28

## 2024-07-17 RX ORDER — DIPHENHYDRAMINE HYDROCHLORIDE 50 MG/ML
50 INJECTION INTRAMUSCULAR; INTRAVENOUS ONCE
Status: COMPLETED | OUTPATIENT
Start: 2024-07-17 | End: 2024-07-17

## 2024-07-17 RX ORDER — CYCLOBENZAPRINE HCL 10 MG
10 TABLET ORAL 3 TIMES DAILY PRN
Qty: 30 TABLET | Refills: 0 | Status: SHIPPED | OUTPATIENT
Start: 2024-07-17 | End: 2024-08-28

## 2024-07-17 RX ORDER — DIPHENHYDRAMINE HCL 25 MG
25 CAPSULE ORAL EVERY 6 HOURS PRN
Qty: 30 CAPSULE | Refills: 0 | Status: SHIPPED | OUTPATIENT
Start: 2024-07-17 | End: 2024-07-17

## 2024-07-17 RX ADMIN — DIPHENHYDRAMINE HYDROCHLORIDE 50 MG: 50 INJECTION, SOLUTION INTRAMUSCULAR; INTRAVENOUS at 15:50

## 2024-07-17 ASSESSMENT — FIBROSIS 4 INDEX: FIB4 SCORE: 0.44

## 2024-07-17 NOTE — ED TRIAGE NOTES
"Pt comes in complaining of neck pain. \" I think I tweaked my neck at work I can't put it straight.\"   "

## 2024-07-17 NOTE — DISCHARGE INSTRUCTIONS
Your symptoms are most consistent likely torticollis.  Take Benadryl as needed for your pain.  You can also take ibuprofen or cyclobenzaprine

## 2024-07-17 NOTE — ED PROVIDER NOTES
ED Provider Note    CHIEF COMPLAINT  Chief Complaint   Patient presents with    Neck Pain              EXTERNAL RECORDS REVIEWED  External ED Note patient's last 3 emergency room visits, 2 to [facility] and went to our facility all related to alcohol overuse    HPI/ROS  LIMITATION TO HISTORY   Select: : None      Anais Larsen is a 23 y.o. female who presents with chief plaint of neck pain.  Patient reports that she was working, it was a very long shift.  She had to hold a tablet in her right arm and kept looking down at it.  She reports that by the end of her shift she had some neck pain.  This morning the neck pain was worsened.  She is having difficulty looking to her right and left.  Patient denies any associated fevers or chills.  She denies any rash.  Patient denies any associated vomiting.  She denies any significant headache.  Patient denies any weakness or numbness.    PAST MEDICAL HISTORY   has a past medical history of Miscarriage and Syphilis.    SURGICAL HISTORY  patient denies any surgical history    FAMILY HISTORY  Family History   Problem Relation Age of Onset    Hypertension Father     Hyperlipidemia Father     Heart Disease Paternal Grandfather     Cancer Other         breast Ca       SOCIAL HISTORY  Social History     Tobacco Use    Smoking status: Never    Smokeless tobacco: Never   Vaping Use    Vaping status: Never Used   Substance and Sexual Activity    Alcohol use: Yes     Comment: 7 drinks/night    Drug use: Yes     Types: Inhaled     Comment: Marijuana    Sexual activity: Yes     Partners: Male     Comment: contraception        CURRENT MEDICATIONS  Home Medications    **Home medications have not yet been reviewed for this encounter**         ALLERGIES  Allergies   Allergen Reactions    Penicillin G Rash     rash    Sulfa Drugs Rash     Rash         PHYSICAL EXAM  VITAL SIGNS: BP (!) 140/98   Pulse (!) 105   Temp 35.9 °C (96.7 °F) (Temporal)   Resp 16   Wt  65 kg (143 lb 4.8 oz)   SpO2 96%   BMI 25.38 kg/m²    Physical Exam  Constitutional:       Appearance: Normal appearance.   HENT:      Mouth/Throat:      Mouth: Mucous membranes are moist.   Neck:      Comments: There is tenderness to palpation along the left trapezius without any significant extension into the paraspinal musculature of the cervical spine.  I am able to range the cervical spine completely without significant pain evoked however on active range of motion patient with some mild pain evoked.  There is no significant nuchal rigidity. Shoulders, elbows and wrists are clear of any tender palpation or significant decrease in range of motion.  Pulmonary:      Effort: Pulmonary effort is normal.   Neurological:      General: No focal deficit present.      Mental Status: She is alert and oriented to person, place, and time.      Comments: Bilateral proximal and distal musculature is 5 out of 5 in strength.  Sensations intact throughout.  Distal pulses are equal bilaterally.   Psychiatric:         Mood and Affect: Mood normal.                 COURSE & MEDICAL DECISION MAKING    Trigger Point Injection Procedure Note    Indication: pain control    Procedure: The patient was placed in the appropriate position and the area over the trigger point was prepped with chlorhexidine. Injection was performed into the trigger point area using 1 cc of 0.25% bupivacaine, this was repeated 3 times along the muscle belly of the trapezius.     The patient tolerated the procedure well.    Complications: None        ASSESSMENT, COURSE AND PLAN  Care Narrative: Patient here with presentation most consistent likely muscle spasm of her left trapezius.  Will perform trigger point injection and see how she does with this.  Given the lack of any trauma, midline tenderness I do not believe that imaging at this point is indicated.  Following trigger point injection patient without much relief.  She now is turning her head almost  forcefully to the left.  This appears most consistent with likely torticollis.  Patient given Benadryl.  Following Benadryl patient symptoms have entirely resolved, she has forage motion of her neck without any pain evoked or any other symptoms.  Patient is requesting discharge home.  Patient provided analgesics and muscle relaxers as well as Benadryl as needed for symptoms.          DISPOSITION AND DISCUSSIONS      Escalation of care considered, and ultimately not performed:Laboratory analysis and diagnostic imaging were deferred in this very well-appearing patient without any trauma.  Very reassuring exam.  Suspicion of meningitis encephalitis very low.      FINAL DIAGNOSIS  1. Torticollis

## 2024-07-18 NOTE — ED NOTES
Pt given discharge instructions/prescription sent to pharm of choosing/ home care instructions explained, pt verbalized understanding of instructions given/pt understands the importance of follow up, pt ambulatory to ER ligia.

## 2024-08-28 ENCOUNTER — HOSPITAL ENCOUNTER (OUTPATIENT)
Facility: MEDICAL CENTER | Age: 24
End: 2024-08-29
Attending: EMERGENCY MEDICINE | Admitting: STUDENT IN AN ORGANIZED HEALTH CARE EDUCATION/TRAINING PROGRAM

## 2024-08-28 ENCOUNTER — APPOINTMENT (OUTPATIENT)
Dept: RADIOLOGY | Facility: MEDICAL CENTER | Age: 24
End: 2024-08-28
Attending: STUDENT IN AN ORGANIZED HEALTH CARE EDUCATION/TRAINING PROGRAM

## 2024-08-28 ENCOUNTER — TELEPHONE (OUTPATIENT)
Dept: CARDIOLOGY | Facility: MEDICAL CENTER | Age: 24
End: 2024-08-28

## 2024-08-28 ENCOUNTER — APPOINTMENT (OUTPATIENT)
Dept: RADIOLOGY | Facility: MEDICAL CENTER | Age: 24
End: 2024-08-28
Attending: EMERGENCY MEDICINE

## 2024-08-28 DIAGNOSIS — R55 SYNCOPE, UNSPECIFIED SYNCOPE TYPE: ICD-10-CM

## 2024-08-28 DIAGNOSIS — F14.90 COCAINE USE: ICD-10-CM

## 2024-08-28 DIAGNOSIS — I49.9 CARDIAC ARRHYTHMIA, UNSPECIFIED CARDIAC ARRHYTHMIA TYPE: ICD-10-CM

## 2024-08-28 DIAGNOSIS — R00.1 BRADYCARDIA: ICD-10-CM

## 2024-08-28 DIAGNOSIS — Z71.9 ENCOUNTER FOR CONSULTATION: ICD-10-CM

## 2024-08-28 PROBLEM — F14.10 COCAINE ABUSE (HCC): Status: ACTIVE | Noted: 2024-08-28

## 2024-08-28 LAB
ALBUMIN SERPL BCP-MCNC: 4.5 G/DL (ref 3.2–4.9)
ALBUMIN/GLOB SERPL: 1.7 G/DL
ALP SERPL-CCNC: 105 U/L (ref 30–99)
ALT SERPL-CCNC: 19 U/L (ref 2–50)
AMPHET UR QL SCN: NEGATIVE
ANION GAP SERPL CALC-SCNC: 12 MMOL/L (ref 7–16)
APPEARANCE UR: ABNORMAL
AST SERPL-CCNC: 24 U/L (ref 12–45)
BACTERIA #/AREA URNS HPF: ABNORMAL /HPF
BARBITURATES UR QL SCN: NEGATIVE
BASOPHILS # BLD AUTO: 0.7 % (ref 0–1.8)
BASOPHILS # BLD: 0.08 K/UL (ref 0–0.12)
BENZODIAZ UR QL SCN: NEGATIVE
BILIRUB SERPL-MCNC: 0.2 MG/DL (ref 0.1–1.5)
BILIRUB UR QL STRIP.AUTO: NEGATIVE
BUN SERPL-MCNC: 19 MG/DL (ref 8–22)
BZE UR QL SCN: POSITIVE
CALCIUM ALBUM COR SERPL-MCNC: 9.6 MG/DL (ref 8.5–10.5)
CALCIUM SERPL-MCNC: 10 MG/DL (ref 8.5–10.5)
CANNABINOIDS UR QL SCN: POSITIVE
CHLORIDE SERPL-SCNC: 104 MMOL/L (ref 96–112)
CO2 SERPL-SCNC: 21 MMOL/L (ref 20–33)
COLOR UR: YELLOW
CREAT SERPL-MCNC: 0.69 MG/DL (ref 0.5–1.4)
EKG IMPRESSION: NORMAL
EKG IMPRESSION: NORMAL
EOSINOPHIL # BLD AUTO: 0.28 K/UL (ref 0–0.51)
EOSINOPHIL NFR BLD: 2.6 % (ref 0–6.9)
EPI CELLS #/AREA URNS HPF: ABNORMAL /HPF
ERYTHROCYTE [DISTWIDTH] IN BLOOD BY AUTOMATED COUNT: 45.5 FL (ref 35.9–50)
FENTANYL UR QL: NEGATIVE
GFR SERPLBLD CREATININE-BSD FMLA CKD-EPI: 123 ML/MIN/1.73 M 2
GLOBULIN SER CALC-MCNC: 2.7 G/DL (ref 1.9–3.5)
GLUCOSE SERPL-MCNC: 96 MG/DL (ref 65–99)
GLUCOSE UR STRIP.AUTO-MCNC: NEGATIVE MG/DL
HCG SERPL QL: NEGATIVE
HCT VFR BLD AUTO: 46 % (ref 37–47)
HGB BLD-MCNC: 15.3 G/DL (ref 12–16)
HYALINE CASTS #/AREA URNS LPF: ABNORMAL /LPF
IMM GRANULOCYTES # BLD AUTO: 0.02 K/UL (ref 0–0.11)
IMM GRANULOCYTES NFR BLD AUTO: 0.2 % (ref 0–0.9)
KETONES UR STRIP.AUTO-MCNC: NEGATIVE MG/DL
LEUKOCYTE ESTERASE UR QL STRIP.AUTO: ABNORMAL
LIPASE SERPL-CCNC: 32 U/L (ref 11–82)
LYMPHOCYTES # BLD AUTO: 3.42 K/UL (ref 1–4.8)
LYMPHOCYTES NFR BLD: 31.6 % (ref 22–41)
MCH RBC QN AUTO: 31.4 PG (ref 27–33)
MCHC RBC AUTO-ENTMCNC: 33.3 G/DL (ref 32.2–35.5)
MCV RBC AUTO: 94.3 FL (ref 81.4–97.8)
METHADONE UR QL SCN: NEGATIVE
MICRO URNS: ABNORMAL
MONOCYTES # BLD AUTO: 0.98 K/UL (ref 0–0.85)
MONOCYTES NFR BLD AUTO: 9.1 % (ref 0–13.4)
NEUTROPHILS # BLD AUTO: 6.03 K/UL (ref 1.82–7.42)
NEUTROPHILS NFR BLD: 55.8 % (ref 44–72)
NITRITE UR QL STRIP.AUTO: NEGATIVE
NRBC # BLD AUTO: 0 K/UL
NRBC BLD-RTO: 0 /100 WBC (ref 0–0.2)
OPIATES UR QL SCN: NEGATIVE
OXYCODONE UR QL SCN: NEGATIVE
PCP UR QL SCN: NEGATIVE
PH UR STRIP.AUTO: 6 [PH] (ref 5–8)
PLATELET # BLD AUTO: 287 K/UL (ref 164–446)
PMV BLD AUTO: 10.2 FL (ref 9–12.9)
POTASSIUM SERPL-SCNC: 4.1 MMOL/L (ref 3.6–5.5)
PROPOXYPH UR QL SCN: NEGATIVE
PROT SERPL-MCNC: 7.2 G/DL (ref 6–8.2)
PROT UR QL STRIP: NEGATIVE MG/DL
RBC # BLD AUTO: 4.88 M/UL (ref 4.2–5.4)
RBC # URNS HPF: ABNORMAL /HPF
RBC UR QL AUTO: ABNORMAL
SODIUM SERPL-SCNC: 137 MMOL/L (ref 135–145)
SP GR UR STRIP.AUTO: 1.02
TROPONIN T SERPL-MCNC: <6 NG/L (ref 6–19)
TSH SERPL DL<=0.005 MIU/L-ACNC: 0.92 UIU/ML (ref 0.38–5.33)
UROBILINOGEN UR STRIP.AUTO-MCNC: 1 MG/DL
WBC # BLD AUTO: 10.8 K/UL (ref 4.8–10.8)
WBC #/AREA URNS HPF: ABNORMAL /HPF

## 2024-08-28 PROCEDURE — 84703 CHORIONIC GONADOTROPIN ASSAY: CPT

## 2024-08-28 PROCEDURE — 93005 ELECTROCARDIOGRAM TRACING: CPT

## 2024-08-28 PROCEDURE — 84484 ASSAY OF TROPONIN QUANT: CPT

## 2024-08-28 PROCEDURE — 85025 COMPLETE CBC W/AUTO DIFF WBC: CPT

## 2024-08-28 PROCEDURE — 93005 ELECTROCARDIOGRAM TRACING: CPT | Performed by: EMERGENCY MEDICINE

## 2024-08-28 PROCEDURE — 71045 X-RAY EXAM CHEST 1 VIEW: CPT

## 2024-08-28 PROCEDURE — 99285 EMERGENCY DEPT VISIT HI MDM: CPT

## 2024-08-28 PROCEDURE — 700111 HCHG RX REV CODE 636 W/ 250 OVERRIDE (IP): Mod: JZ | Performed by: EMERGENCY MEDICINE

## 2024-08-28 PROCEDURE — 700105 HCHG RX REV CODE 258: Performed by: EMERGENCY MEDICINE

## 2024-08-28 PROCEDURE — 96372 THER/PROPH/DIAG INJ SC/IM: CPT

## 2024-08-28 PROCEDURE — 36415 COLL VENOUS BLD VENIPUNCTURE: CPT

## 2024-08-28 PROCEDURE — 700111 HCHG RX REV CODE 636 W/ 250 OVERRIDE (IP): Mod: JZ | Performed by: STUDENT IN AN ORGANIZED HEALTH CARE EDUCATION/TRAINING PROGRAM

## 2024-08-28 PROCEDURE — 84443 ASSAY THYROID STIM HORMONE: CPT

## 2024-08-28 PROCEDURE — 83690 ASSAY OF LIPASE: CPT

## 2024-08-28 PROCEDURE — 81001 URINALYSIS AUTO W/SCOPE: CPT

## 2024-08-28 PROCEDURE — 80307 DRUG TEST PRSMV CHEM ANLYZR: CPT

## 2024-08-28 PROCEDURE — G0378 HOSPITAL OBSERVATION PER HR: HCPCS

## 2024-08-28 PROCEDURE — 70450 CT HEAD/BRAIN W/O DYE: CPT

## 2024-08-28 PROCEDURE — 80053 COMPREHEN METABOLIC PANEL: CPT

## 2024-08-28 PROCEDURE — 99223 1ST HOSP IP/OBS HIGH 75: CPT | Performed by: STUDENT IN AN ORGANIZED HEALTH CARE EDUCATION/TRAINING PROGRAM

## 2024-08-28 PROCEDURE — 96374 THER/PROPH/DIAG INJ IV PUSH: CPT

## 2024-08-28 RX ORDER — ENOXAPARIN SODIUM 100 MG/ML
40 INJECTION SUBCUTANEOUS DAILY
Status: DISCONTINUED | OUTPATIENT
Start: 2024-08-28 | End: 2024-08-29 | Stop reason: HOSPADM

## 2024-08-28 RX ORDER — SODIUM CHLORIDE, SODIUM LACTATE, POTASSIUM CHLORIDE, CALCIUM CHLORIDE 600; 310; 30; 20 MG/100ML; MG/100ML; MG/100ML; MG/100ML
1000 INJECTION, SOLUTION INTRAVENOUS ONCE
Status: COMPLETED | OUTPATIENT
Start: 2024-08-28 | End: 2024-08-28

## 2024-08-28 RX ORDER — ONDANSETRON 2 MG/ML
4 INJECTION INTRAMUSCULAR; INTRAVENOUS ONCE
Status: COMPLETED | OUTPATIENT
Start: 2024-08-28 | End: 2024-08-28

## 2024-08-28 RX ORDER — ACETAMINOPHEN 325 MG/1
650 TABLET ORAL EVERY 6 HOURS PRN
Status: DISCONTINUED | OUTPATIENT
Start: 2024-08-28 | End: 2024-08-29 | Stop reason: HOSPADM

## 2024-08-28 RX ORDER — IBUPROFEN 200 MG
600 TABLET ORAL EVERY 6 HOURS PRN
COMMUNITY

## 2024-08-28 RX ADMIN — ENOXAPARIN SODIUM 40 MG: 100 INJECTION SUBCUTANEOUS at 23:58

## 2024-08-28 RX ADMIN — SODIUM CHLORIDE, POTASSIUM CHLORIDE, SODIUM LACTATE AND CALCIUM CHLORIDE 1000 ML: 600; 310; 30; 20 INJECTION, SOLUTION INTRAVENOUS at 20:14

## 2024-08-28 RX ADMIN — ONDANSETRON 4 MG: 2 INJECTION INTRAMUSCULAR; INTRAVENOUS at 20:14

## 2024-08-28 ASSESSMENT — FIBROSIS 4 INDEX: FIB4 SCORE: 0.44

## 2024-08-28 ASSESSMENT — PAIN DESCRIPTION - PAIN TYPE: TYPE: ACUTE PAIN

## 2024-08-28 ASSESSMENT — ENCOUNTER SYMPTOMS
PSYCHIATRIC NEGATIVE: 1
CONSTITUTIONAL NEGATIVE: 1

## 2024-08-29 ENCOUNTER — PATIENT OUTREACH (OUTPATIENT)
Dept: SCHEDULING | Facility: IMAGING CENTER | Age: 24
End: 2024-08-29

## 2024-08-29 ENCOUNTER — APPOINTMENT (OUTPATIENT)
Dept: CARDIOLOGY | Facility: MEDICAL CENTER | Age: 24
End: 2024-08-29
Attending: STUDENT IN AN ORGANIZED HEALTH CARE EDUCATION/TRAINING PROGRAM

## 2024-08-29 VITALS
DIASTOLIC BLOOD PRESSURE: 61 MMHG | HEIGHT: 63 IN | BODY MASS INDEX: 28.05 KG/M2 | TEMPERATURE: 97.6 F | HEART RATE: 58 BPM | WEIGHT: 158.29 LBS | RESPIRATION RATE: 16 BRPM | SYSTOLIC BLOOD PRESSURE: 117 MMHG | OXYGEN SATURATION: 96 %

## 2024-08-29 PROBLEM — R55 SYNCOPE AND COLLAPSE: Status: RESOLVED | Noted: 2024-08-28 | Resolved: 2024-08-29

## 2024-08-29 PROBLEM — R55 SYNCOPE: Status: RESOLVED | Noted: 2024-08-28 | Resolved: 2024-08-29

## 2024-08-29 LAB
LV EJECT FRACT MOD 2C 99903: 65.12
LV EJECT FRACT MOD 4C 99902: 69.11
LV EJECT FRACT MOD BP 99901: 67

## 2024-08-29 PROCEDURE — G0378 HOSPITAL OBSERVATION PER HR: HCPCS

## 2024-08-29 PROCEDURE — 99239 HOSP IP/OBS DSCHRG MGMT >30: CPT | Performed by: INTERNAL MEDICINE

## 2024-08-29 PROCEDURE — 93306 TTE W/DOPPLER COMPLETE: CPT

## 2024-08-29 PROCEDURE — 93306 TTE W/DOPPLER COMPLETE: CPT | Mod: 26 | Performed by: INTERNAL MEDICINE

## 2024-08-29 SDOH — ECONOMIC STABILITY: TRANSPORTATION INSECURITY
IN THE PAST 12 MONTHS, HAS LACK OF RELIABLE TRANSPORTATION KEPT YOU FROM MEDICAL APPOINTMENTS, MEETINGS, WORK OR FROM GETTING THINGS NEEDED FOR DAILY LIVING?: NO

## 2024-08-29 SDOH — ECONOMIC STABILITY: TRANSPORTATION INSECURITY
IN THE PAST 12 MONTHS, HAS THE LACK OF TRANSPORTATION KEPT YOU FROM MEDICAL APPOINTMENTS OR FROM GETTING MEDICATIONS?: NO

## 2024-08-29 ASSESSMENT — PAIN DESCRIPTION - PAIN TYPE
TYPE: ACUTE PAIN

## 2024-08-29 ASSESSMENT — SOCIAL DETERMINANTS OF HEALTH (SDOH)
WITHIN THE LAST YEAR, HAVE TO BEEN RAPED OR FORCED TO HAVE ANY KIND OF SEXUAL ACTIVITY BY YOUR PARTNER OR EX-PARTNER?: NO
WITHIN THE PAST 12 MONTHS, THE FOOD YOU BOUGHT JUST DIDN'T LAST AND YOU DIDN'T HAVE MONEY TO GET MORE: NEVER TRUE
WITHIN THE LAST YEAR, HAVE YOU BEEN KICKED, HIT, SLAPPED, OR OTHERWISE PHYSICALLY HURT BY YOUR PARTNER OR EX-PARTNER?: NO
WITHIN THE LAST YEAR, HAVE YOU BEEN HUMILIATED OR EMOTIONALLY ABUSED IN OTHER WAYS BY YOUR PARTNER OR EX-PARTNER?: NO
WITHIN THE LAST YEAR, HAVE YOU BEEN AFRAID OF YOUR PARTNER OR EX-PARTNER?: NO
WITHIN THE PAST 12 MONTHS, YOU WORRIED THAT YOUR FOOD WOULD RUN OUT BEFORE YOU GOT THE MONEY TO BUY MORE: NEVER TRUE
IN THE PAST 12 MONTHS, HAS THE ELECTRIC, GAS, OIL, OR WATER COMPANY THREATENED TO SHUT OFF SERVICE IN YOUR HOME?: NO

## 2024-08-29 ASSESSMENT — ENCOUNTER SYMPTOMS
CHILLS: 0
VOMITING: 0
SPUTUM PRODUCTION: 0
NAUSEA: 1
MUSCULOSKELETAL NEGATIVE: 1
ABDOMINAL PAIN: 0
RESPIRATORY NEGATIVE: 1
CARDIOVASCULAR NEGATIVE: 1
FEVER: 0
EYES NEGATIVE: 1
SHORTNESS OF BREATH: 0
NEUROLOGICAL NEGATIVE: 1
DIAPHORESIS: 0
DIARRHEA: 0
NERVOUS/ANXIOUS: 1

## 2024-08-29 ASSESSMENT — LIFESTYLE VARIABLES
TOTAL SCORE: 0
HAVE YOU EVER FELT YOU SHOULD CUT DOWN ON YOUR DRINKING: NO
TOTAL SCORE: 0
HAVE PEOPLE ANNOYED YOU BY CRITICIZING YOUR DRINKING: NO
EVER HAD A DRINK FIRST THING IN THE MORNING TO STEADY YOUR NERVES TO GET RID OF A HANGOVER: NO
HOW MANY TIMES IN THE PAST YEAR HAVE YOU HAD 5 OR MORE DRINKS IN A DAY: 0
AVERAGE NUMBER OF DAYS PER WEEK YOU HAVE A DRINK CONTAINING ALCOHOL: 1
CONSUMPTION TOTAL: NEGATIVE
TOTAL SCORE: 0
DOES PATIENT WANT TO STOP DRINKING: NO
EVER FELT BAD OR GUILTY ABOUT YOUR DRINKING: NO
ALCOHOL_USE: YES
ON A TYPICAL DAY WHEN YOU DRINK ALCOHOL HOW MANY DRINKS DO YOU HAVE: 4

## 2024-08-29 ASSESSMENT — PATIENT HEALTH QUESTIONNAIRE - PHQ9
1. LITTLE INTEREST OR PLEASURE IN DOING THINGS: NOT AT ALL
SUM OF ALL RESPONSES TO PHQ9 QUESTIONS 1 AND 2: 0
2. FEELING DOWN, DEPRESSED, IRRITABLE, OR HOPELESS: NOT AT ALL

## 2024-08-29 ASSESSMENT — FIBROSIS 4 INDEX: FIB4 SCORE: 0.48

## 2024-08-29 NOTE — ED TRIAGE NOTES
".  Chief Complaint   Patient presents with    Syncope     Pt had a witnessed syncopal event at work. Pt states they fell into chair.Pt states they have never had this happen before    Anxiety     Pt states they were having a conversation with coworker when they started to feel like they had a weight on chest and hyperventilating. Pt remembers collapsing and then being woken up by coworker.     Headache     Pt states they hit head on chair when collapsing. 7/10 pain currently       .Patient ambulatory to triage for above complaint. Patient aware to notify RN of any changes in symptoms. Patient educated on triage process. A&O x 4 and placed in waiting room    ./75   Pulse 78   Temp 36.7 °C (98.1 °F) (Temporal)   Resp 16   Ht 1.6 m (5' 3\")   Wt 70.4 kg (155 lb 3.3 oz)   LMP 08/28/2024 (Exact Date) Comment: pt on period currently  SpO2 97%   BMI 27.49 kg/m²       "

## 2024-08-29 NOTE — CARE PLAN
The patient is Stable - Low risk of patient condition declining or worsening    Shift Goals  Clinical Goals: ECHO, Tele monitoring, Safety  Patient Goals: Rest  Family Goals: Rest    Progress made toward(s) clinical / shift goals:    Problem: Pain - Standard  Goal: Alleviation of pain or a reduction in pain to the patient’s comfort goal  Description: Target End Date:  8/29/24    1.  Document pain using the appropriate pain scale per order or unit policy  2.  Educate and implement non-pharmacologic comfort measures (i.e. relaxation, distraction, massage, cold/heat therapy, etc.)  3.  Pain management medications as ordered  4.  Reassess pain after pain med administration per policy  5.  If opiods administered assess patient's response to pain medication is appropriate per POSS sedation scale  6.  Follow pain management plan developed in collaboration with patient and interdisciplinary team (including palliative care or pain specialists if applicable)  Outcome: Progressing     Problem: Knowledge Deficit - Standard  Goal: Patient and family/care givers will demonstrate understanding of plan of care, disease process/condition, diagnostic tests and medications  Description: Target End Date:  8/29/24    1.  Patient and family/caregiver oriented to unit, equipment, visitation policy and means for communicating concern  2.  Complete/review Learning Assessment  3.  Assess knowledge level of disease process/condition, treatment plan, diagnostic tests and medications  4.  Explain disease process/condition, treatment plan, diagnostic tests and medications  Outcome: Progressing     Problem: Hemodynamics  Goal: Patient's hemodynamics, fluid balance and neurologic status will be stable or improve  Description: Target End Date:  8/29/24    1.  Monitor vital signs, pulse oximetry and cardiac monitor per provider order and/or policy  2.  Maintain blood pressure per provider order  3.  Hemodynamic monitoring per provider order  4.   Manage IV fluids and IV infusions  5.  Monitor intake and output  6.  Daily weights per unit policy or provider order  7.  Assess peripheral pulses and capillary refill  8.  Assess color and body temperature  9.  Position patient for maximum circulation/cardiac output  10. Monitor for signs/symptoms of excessive bleeding  11. Assess mental status, restlessness and changes in level of consciousness  12. Monitor temperature and report fever or hypothermia to provider immediately. Consideration of targeted temperature management.  Outcome: Progressing

## 2024-08-29 NOTE — ED NOTES
Medication history reviewed with patient at bedside.   Med rec is complete  Allergies reviewed.     Patient has not had any outpatient antibiotics in the last 30 days.   Anticoagulants: No    Bonnie Kidd

## 2024-08-29 NOTE — DISCHARGE INSTRUCTIONS
FOLLOW UP ITEMS POST DISCHARGE  Please call [phone] to schedule PCP appointment for patient.    Required specialty appointments include:       Discharge Instructions per PAOLA Yip    -Follow-up with PCP s/p hospitalization  -Stop using cocaine and cannabis  -Keep yourself hydrated  -Continue any home medications if any    DIET: As tolerated    ACTIVITY: As tolerated    DIAGNOSIS: Syncope    Return to ER if symptoms persist, chest pain, palpitations, shortness of breath, numbness, tingling, weakness, and high fevers.      Syncope, Adult    Syncope is when you pass out or faint for a short time. It is caused by a sudden decrease in blood flow to the brain. This can happen for many reasons. It can sometimes happen when seeing blood, getting a shot (injection), or having pain or strong emotions. Most causes of fainting are not dangerous, but in some cases it can be a sign of a serious medical problem.  If you faint, get help right away. Call your local emergency services (911 in the [place]  Follow these instructions at home:  Watch for any changes in your symptoms. Take these actions to stay safe and help with your symptoms:  Knowing when you may be about to faint  Signs that you may be about to faint include:  Feeling dizzy or light-headed. It may feel like the room is spinning.  Feeling weak.  Feeling like you may vomit (nauseous).  Seeing spots or seeing all white or all black.  Having cold, clammy skin.  Feeling warm and sweaty.  Hearing ringing in the ears.  If you start to feel like you might faint, sit or lie down right away. If sitting, lower your head down between your legs. If lying down, raise (elevate) your feet above the level of your heart.  Breathe deeply and steadily. Wait until all of the symptoms are gone.  Have someone stay with you until you feel better.  Medicines  Take over-the-counter and prescription medicines only as told by your doctor.  If you are taking blood  pressure or heart medicine, sit up and stand up slowly. Spend a few minutes getting ready to sit and then stand. This can help you feel less dizzy.  Lifestyle  Do not drive, use machinery, or play sports until your doctor says it is okay.  Do not drink alcohol.  Do not smoke or use any products that contain nicotine or tobacco. If you need help quitting, ask your doctor.  Avoid hot tubs and saunas.  General instructions  Talk with your doctor about your symptoms. You may need to have testing to help find the cause.  Drink enough fluid to keep your pee (urine) pale yellow.  Avoid standing for a long time. If you must stand for a long time, do movements such as:  Moving your legs.  Crossing your legs.  Flexing and stretching your leg muscles.  Squatting.  Keep all follow-up visits.  Contact a doctor if:  You have episodes of near fainting.  Get help right away if:  You pass out or faint.  You hit your head or are injured after fainting.  You have any of these symptoms:  Fast or uneven heartbeats (palpitations).  Pain in your chest, belly, or back.  Shortness of breath.  You have jerky movements that you cannot control (seizure).  You have a very bad headache.  You are confused.  You have problems with how you see (vision).  You are very weak.  You have trouble walking.  You are bleeding from your mouth or your butt (rectum).  You have black or tarry poop (stool).  These symptoms may be an emergency. Get help right away. Call your local emergency services (911 in the [place]  Do not wait to see if the symptoms will go away.  Do not drive yourself to the hospital.  Summary  Syncope is when you pass out or faint for a short time. It is caused by a sudden decrease in blood flow to the brain.  Signs that you may be about to faint include feeling dizzy or light-headed, feeling like you may vomit, seeing all white or all black, or having cold, clammy skin.  If you start to feel like you might faint, sit or lie down right  away. Lower your head if sitting, or raise (elevate) your feet if lying down. Breathe deeply and steadily. Wait until all of the symptoms are gone.  This information is not intended to replace advice given to you by your health care provider. Make sure you discuss any questions you have with your health care provider.  Document Revised: 04/28/2022 Document Reviewed: 04/28/2022  Elsevier Patient Education © 2023 [employer]

## 2024-08-29 NOTE — HOSPITAL COURSE
Ms. Anais Larsen is a 23 y.o. female with a past medical history of syphilis, miscarriage, cocaine use, cannabis use, who presented 8/28/2024 with a syncopal event.      Patient was at work when she was engaging in a conversation with her colleague about a traumatic event.  This left patient to feel stressed out and anxious.  She felt chest pressure and was hyperventilating and suddenly syncopized.  She reports head strike on her way down.  She woke up on the floor.  She has never had a syncopal episode in the past.     At [facility] ED, patient found to have several bradycardia arrhythmias along with intermittent PVCs on monitor.  U tox positive for cocaine and cannabis.  Initial troponin negative and EKG showing sinus bradycardia.  Cardiology consulted, no acute intervention from their standpoint and recommended medical admission.    During his hospitalization, an echocardiogram was pursued which noted LVEF approximately 65% with no inferior vena cava size and inspiratory collapse.  Patient seen and examined prior to being discharged.  Discussed with patient imaging results which is negative for any acute pathologies.  Patient counseled and educated in regards to using street drugs such as cannabis and cocaine.  Patient to resume any other home medications at home if any.  Patient to follow-up with PCP for management of care.  All questions and concerns answered prior to being discharged.  Patient discharged home.

## 2024-08-29 NOTE — ED NOTES
Bedside report received from off going RN/tech: EDE Chanel assumed care of patient.  POC discussed with patient. Call light within reach, all needs addressed at this time.       Fall risk interventions in place: Keep floor surfaces clean and dry (all applicable per [place] Fall risk assessment)   Continuous monitoring: Cardiac Leads, Pulse Ox, or Blood Pressure  IVF/IV medications: Not Applicable   Oxygen: Room Air  Bedside sitter: Not Applicable   Isolation: Not Applicable

## 2024-08-29 NOTE — H&P
Hospital Medicine History & Physical Note    Date of Service  8/28/2024    Primary Care Physician  Pcp Pt States None    Consultants  None    Code Status  Full Code    Chief Complaint  Chief Complaint   Patient presents with    Syncope     Pt had a witnessed syncopal event at work. Pt states they fell into chair.Pt states they have never had this happen before    Anxiety     Pt states they were having a conversation with coworker when they started to feel like they had a weight on chest and hyperventilating. Pt remembers collapsing and then being woken up by coworker.     Headache     Pt states they hit head on chair when collapsing. 7/10 pain currently       History of Presenting Illness  Anais Larsen is a 23 y.o. female who presented 8/28/2024 with a syncopal event.  Today patient was at work when she was engaging in a conversation with her colleague about a traumatic event.  This left patient to feel stressed out and anxious.  She felt chest pressure and was hyperventilating and suddenly syncopized.  She reports head strike on her way down.  She woke up on the floor.  She has never had a syncopal episode in the past.    At [facility] ED, patient found to have several bradycardia arrhythmias along with intermittent PVCs on monitor.  U tox positive for cocaine and cannabis.  Initial troponin negative and EKG showing sinus bradycardia.  Cardiology consulted, no acute intervention from their standpoint and recommended medical admission.    I discussed the plan of care with patient.    Review of Systems  Review of Systems   Constitutional: Negative.    Psychiatric/Behavioral: Negative.         Past Medical History   has a past medical history of Miscarriage and Syphilis.    Surgical History   has no past surgical history on file.     Family History  family history includes Cancer in an other family member; Heart Disease in her paternal grandfather; Hyperlipidemia in her father; Hypertension in her father.   Family  history reviewed with patient. There is no family history that is pertinent to the chief complaint.     Social History   reports that she has never smoked. She has never used smokeless tobacco. She reports current alcohol use. She reports current drug use. Drug: Inhaled.    Allergies  Allergies   Allergen Reactions    Penicillin G Rash     rash    Sulfa Drugs Rash     Rash         Medications  Prior to Admission Medications   Prescriptions Last Dose Informant Patient Reported? Taking?   cyclobenzaprine (FLEXERIL) 10 mg Tab   No No   Sig: Take 1 Tablet by mouth 3 times a day as needed for Muscle Spasms or Moderate Pain.   diphenhydrAMINE (BENADRYL) 25 MG capsule   No No   Sig: Take 1 Capsule by mouth every 6 hours as needed (muscle spasm).   ondansetron (ZOFRAN ODT) 4 MG TABLET DISPERSIBLE   No No   Sig: Take 1 Tablet by mouth every 8 hours as needed.      Facility-Administered Medications: None       Physical Exam  Temp:  [36.7 °C (98.1 °F)] 36.7 °C (98.1 °F)  Pulse:  [49-78] 69  Resp:  [16-20] 19  BP: (118-131)/(60-75) 118/68  SpO2:  [93 %-99 %] 99 %  Blood Pressure: 118/68   Temperature: 36.7 °C (98.1 °F)   Pulse: 69   Respiration: 19   Pulse Oximetry: 99 %       Physical Exam  Constitutional:       Appearance: Normal appearance. She is normal weight.   HENT:      Head: Normocephalic.      Nose: Nose normal.      Mouth/Throat:      Mouth: Mucous membranes are moist.   Eyes:      Pupils: Pupils are equal, round, and reactive to light.   Cardiovascular:      Rate and Rhythm: Normal rate and regular rhythm.      Pulses: Normal pulses.   Pulmonary:      Effort: Pulmonary effort is normal.      Breath sounds: Normal breath sounds.   Abdominal:      General: Abdomen is flat. Bowel sounds are normal.      Palpations: Abdomen is soft.   Musculoskeletal:         General: Normal range of motion.      Cervical back: Neck supple.   Skin:     General: Skin is warm.   Neurological:      General: No focal deficit present.       "Mental Status: She is alert and oriented to person, place, and time. Mental status is at baseline.   Psychiatric:         Mood and Affect: Mood normal.         Behavior: Behavior normal.         Thought Content: Thought content normal.         Judgment: Judgment normal.         Laboratory:  Recent Labs     08/28/24 2018   WBC 10.8   RBC 4.88   HEMOGLOBIN 15.3   HEMATOCRIT 46.0   MCV 94.3   MCH 31.4   MCHC 33.3   RDW 45.5   PLATELETCT 287   MPV 10.2     Recent Labs     08/28/24 2018   SODIUM 137   POTASSIUM 4.1   CHLORIDE 104   CO2 21   GLUCOSE 96   BUN 19   CREATININE 0.69   CALCIUM 10.0     Recent Labs     08/28/24 2018   ALTSGPT 19   ASTSGOT 24   ALKPHOSPHAT 105*   TBILIRUBIN 0.2   LIPASE 32   GLUCOSE 96         No results for input(s): \"NTPROBNP\" in the last 72 hours.      Recent Labs     08/28/24 2018   TROPONINT <6       Imaging:  DX-CHEST-PORTABLE (1 VIEW)   Final Result         1.  No acute cardiopulmonary disease.      EC-ECHOCARDIOGRAM COMPLETE W/O CONT    (Results Pending)       EKG:  I have personally reviewed the images and compared with prior images.    Assessment/Plan:  Justification for Admission Status  I anticipate this patient is appropriate for observation status at this time because pt had syncope    Patient will need a Telemetry bed on MEDICAL service .  The need is secondary to syncope.    * Syncope  Assessment & Plan  Noted to have a syncopal episode.  In ER, noted to have several episodes sinus bradycardia into the 40s. Initial troponin negative and TSH within normal limits.  Doubt ACS.  Will order CT head as patient reports head strike as she syncopized.  Cardiology consulted, recommends monitoring telemetry overnight and echocardiogram in a.m.      Cocaine abuse (HCC)  Assessment & Plan  Avoid beta-blockers          VTE prophylaxis: enoxaparin ppx  "

## 2024-08-29 NOTE — TELEPHONE ENCOUNTER
E-Consult Response     E-consult was requested by Dr. Lazaro Olivo.  Consent for E-consult was obtained by Dr. Lazaro Olivo. After careful review of the patient's information available in the medical record, the following are my findings and recommendations:    Reason for consult:  Abnormal EKG    Summary of data reviewed: This is a 23 year old woman with no prior cardiac history and intermittent cocaine use who presented with syncope. EKG shows sinus rhythm with frequent PACs.    Recommendations:  It would be reasonable to observe her on telemetry for any arrhythmia and be considered for event monitor outpatient, and obtain routine echocardiogram given history of drug use.     E-Consult Time: 12 minutes were spent with >50% of the total time spent discussing plan of care with requesting physician (Use code [id number])    Yaritza Cavazos M.D.

## 2024-08-29 NOTE — ED PROVIDER NOTES
ED Provider Note    CHIEF COMPLAINT  Chief Complaint   Patient presents with    Syncope     Pt had a witnessed syncopal event at work. Pt states they fell into chair.Pt states they have never had this happen before    Anxiety     Pt states they were having a conversation with coworker when they started to feel like they had a weight on chest and hyperventilating. Pt remembers collapsing and then being woken up by coworker.     Headache     Pt states they hit head on chair when collapsing. 7/10 pain currently       EXTERNAL RECORDS REVIEWED  Outside ER visit from [facility] where the patient had reports of anxiety attack, lightheaded ground-level fall with positive loss of consciousness.  EKG at that time documented however unable to see interpretation.  Was documented as being leaving AGAINST MEDICAL ADVICE,    HPI/ROS  LIMITATION TO HISTORY   Select: : None  OUTSIDE HISTORIAN(S):  None    Anais Larsen is a 23 y.o. female with a PMHx shekhar-menstrual migraines who presents 8/28/24 for syncopal episode. She reports that at 7 am today at work after moving some pallets she went in to the break room to talk with a colleague. While receiving some disturbing news she began to feel hot, sweaty, dizzy, and emotionally overwhelmed and anxious. She then passed out and hit her head on a chair. Per her colleague she was passed out for one-two minutes and then was awakened. She did not have tongue biting nor wet herself. This has never happened before. She had not eaten since the morning prior and had not been hydrating well. She is emotionally stressed out lately due to life stressors.     She went to [place] to be evaluated but after waiting for three hours she left and went home to take a nap. She presented here due to feeling like she still should be evaluated. Currently she still has a mild headache and mild nausea that comes and goes. She is currently menstruating. Denies visual changes. She  "uses daily marijuana and cocaine every once in awhile, last was two days ago. Denies personal history of heart conditions and is unsure of family history of heart conditions.     PAST MEDICAL HISTORY   has a past medical history of Miscarriage and Syphilis.    SURGICAL HISTORY  patient denies any surgical history    FAMILY HISTORY  Family History   Problem Relation Age of Onset    Hypertension Father     Hyperlipidemia Father     Heart Disease Paternal Grandfather     Cancer Other         breast Ca       SOCIAL HISTORY  Social History     Tobacco Use    Smoking status: Never    Smokeless tobacco: Never   Vaping Use    Vaping status: Never Used   Substance and Sexual Activity    Alcohol use: Yes     Comment: 7 drinks/night    Drug use: Yes     Types: Inhaled     Comment: Marijuana    Sexual activity: Yes     Partners: Male     Comment: contraception        CURRENT MEDICATIONS  Home Medications       Reviewed by Mirna Bolaños R.N. (Registered Nurse) on 08/28/24 at 1850  Med List Status: Partial     Medication Last Dose Status   cyclobenzaprine (FLEXERIL) 10 mg Tab  Active   diphenhydrAMINE (BENADRYL) 25 MG capsule  Active   ondansetron (ZOFRAN ODT) 4 MG TABLET DISPERSIBLE  Active                  Audit from Redirected Encounters    **Home medications have not yet been reviewed for this encounter**         ALLERGIES  Allergies   Allergen Reactions    Penicillin G Rash     rash    Sulfa Drugs Rash     Rash         PHYSICAL EXAM  VITAL SIGNS: /75   Pulse 78   Temp 36.7 °C (98.1 °F) (Temporal)   Resp 16   Ht 1.6 m (5' 3\")   Wt 70.4 kg (155 lb 3.3 oz)   LMP 08/28/2024 (Exact Date) Comment: pt on period currently  SpO2 97%   BMI 27.49 kg/m²    Genl: F sitting in bed comfortably, speaking clearly, appears in no acute distress   Head: NC/AT, no tenderness to palpation   ENT: Mucous membranes dry, posterior pharynx clear, uvula midline, nares patent bilaterally   Eyes: Normal sclera, pupils equal round " reactive to light, wears eyeglasses  Neck: Supple, FROM, no cervical spinal tenderness or stepoffs  Pulmonary: Lungs are clear to auscultation bilaterally  Chest: No TTP anterior or posterior  CV:  irregular rhythm versus regular with pauses, no murmur appreciated  Abdomen: soft, NT/ND; no rebound/guarding, no masses palpated, no HSM   Musculoskeletal: Pain free ROM of the neck. Moving upper and lower extremities in spontaneous and coordinated fashion  Neuro: A&Ox4 (person, place, time, situation), speech fluent, gait steady, no focal deficits appreciated, No cerebellar signs. Sensation is grossly intact in the distal upper and lower extremities.  5/5 strength in  and dorsiflexion/plantar flexion of the ankles  Skin: No rash or lesions.  No pallor or jaundice.  No cyanosis.  Warm and dry.       EKG/LABS  Labs Reviewed   CBC WITH DIFFERENTIAL - Abnormal; Notable for the following components:       Result Value    Monos (Absolute) 0.98 (*)     All other components within normal limits   COMP METABOLIC PANEL - Abnormal; Notable for the following components:    Alkaline Phosphatase 105 (*)     All other components within normal limits   URINALYSIS - Abnormal; Notable for the following components:    Character Cloudy (*)     Leukocyte Esterase Trace (*)     Occult Blood Trace (*)     All other components within normal limits   URINE DRUG SCREEN - Abnormal; Notable for the following components:    Cocaine Metabolite Positive (*)     Cannabinoid Metab Positive (*)     All other components within normal limits   URINE MICROSCOPIC (W/UA) - Abnormal; Notable for the following components:    RBC 2-5 (*)     Bacteria Moderate (*)     Epithelial Cells Moderate (*)     All other components within normal limits   HCG QUAL SERUM   TROPONIN   LIPASE   TSH WITH REFLEX TO FT4   ESTIMATED GFR     Results for orders placed or performed during the hospital encounter of 08/28/24   EKG   Result Value Ref Range    Report       [facility] Emergency Dept[facility]    Test Date:  2024  Pt Name:    [facility]                   Department: ER  MRN:        [id number]                      Room:  Gender:     Female                       Technician: [id number]  :        2000                   Requested By:ER TRIAGE PROTOCOL  Order #:    [id number]                    Reading MD: Geovani Willis MD    Measurements  Intervals                                Axis  Rate:       64                           P:          42  PA:         137                          QRS:        39  QRSD:       93                           T:          45  QT:         392  QTc:        405    Interpretive Statements  Sinus arrhythmia, rate of 64, sinus pause, appearance of return to sinus  without PA prolongation or dropped P waves.  No acute ischemia or infarction.  Compared to ECG 2024 01:22:48  Sinus rhythm no longer present  Electronically Signed On 2024 19:54:08 PDT by Geovani Willis MD     EKG   Result Value Ref Range    Report       [facility] Emergency Dept.    Test Date:  2024  Pt Name:    [facility]                   Department: ER  MRN:        [id number]                      Room:       PA 81  Gender:     Female                       Technician: [id number]  :        2000                   Requested By:LEXI RENEE  Order #:    [id number]                    Reading MD: Geovani Willis MD    Measurements  Intervals                                Axis  Rate:       54                           P:          30  PA:         136                          QRS:        70  QRSD:       98                           T:          51  QT:         422  QTc:        400    Interpretive Statements  Sinus bradycardia, rate of 54, Atrial premature complex, normal interval and  axis,  ST elev, probable normal early repol pattern  Compared to ECG 2024 18:30:21    Electronically Signed On 2024 22:44:57 PDT by Geovani Willis MD     I have  independently interpreted this EKG    RADIOLOGY/PROCEDURES   I have independently interpreted the diagnostic imaging associated with this visit and am waiting the final reading from the radiologist.   My preliminary interpretation is as follows: Cardiomegaly, no infiltrates    Radiologist interpretation:  DX-CHEST-PORTABLE (1 VIEW)   Final Result         1.  No acute cardiopulmonary disease.          COURSE & MEDICAL DECISION MAKING    ASSESSMENT, COURSE AND PLAN  Care Narrative: 23 year old female with no major medical conditions presenting for a syncopal episode while receiving emotionally disturbing news at work. She felt hot, sweaty, nauseous, and anxious and then lost consciousness for several minutes with minor head trauma.  She has no focal neurological deficits, no pain or discomfort and is able to ambulate with steady gait.  Do not see signs of increased intracranial pressure and my main concern is the surrounding process regarding her recent syncopal event after presenting symptomology where an EKG showing bradycardia arrhythmia, possible sinus pause.    She also had not eaten in 24 hrs and had not been hydrating well. History is suggestive of vasovagal syncope with emotional distress and possible dehydration/hypoglycemia as triggers, though given her irregular cardiac exam a cardiac etiology could not be immediately ruled out. EKG showed no definitive heart block though was somewhat irregular in that there was possible sinus pause. Fluid bolus administered, labs including CBC, CMP, TSH, troponin, UDS, HCG ordered for further evaluation.     There is no troponin elevation, no gross metabolic derangements, she has endorsed to regular cocaine use in addition to cannabinoid use, thyroid panel is negative, urinalysis appears contaminated with no gross findings of stone or infectious etiology.  hCG is negative.    This is unclear in total etiology and I have my concerns regarding setting of stimulant use,  bradycardia arrhythmia and recent syncope that appears to be more vasovagal in nature.  I spoke with Dr. Cavazos of cardiology who would currently recommend observation admission on telemetry and possible echocardiography.  I have spoken with the patient who is amenable to this.    I have spoken to the hospitalist who will admit in guarded condition.    Hydration: Based on the patient's presentation of Dehydration and Other syncope the patient was given IV fluids. IV Hydration was used because oral hydration was not adequate alone. Upon recheck following hydration, the patient was improved.      FINAL DIAGNOSIS  1. Bradycardia    2. Cardiac arrhythmia, unspecified cardiac arrhythmia type    3. Syncope, unspecified syncope type    4. Cocaine use       Electronically signed by: Lazaro Olivo M.D., 8/28/2024 7:08 PM

## 2024-08-29 NOTE — PROGRESS NOTES
Pt transported to floor with all belongings in place. Pt family at bedside. Pt denies pain at this time. Call light in reach. Bed in lowest position. Fall precautions in place. Plan of care discussed with pt. Pt agreeing to plan of care. Communication board updated. All questions answered. Assessment completed.

## 2024-08-29 NOTE — PROGRESS NOTES
4 Eyes Skin Assessment Completed by EDE Barrera and EDE Oneal.    Head WDL  Ears WDL  Nose WDL  Mouth WDL  Neck WDL  Breast/Chest WDL  Shoulder Blades WDL  Spine WDL  (R) Arm/Elbow/Hand WDL  (L) Arm/Elbow/Hand WDL  Abdomen WDL  Groin WDL  Scrotum/Coccyx/Buttocks WDL  (R) Leg WDL  (L) Leg WDL  (R) Heel/Foot/Toe WDL  (L) Heel/Foot/Toe WDL          Devices In Places Tele Box and Pulse Ox      Interventions In Place Pillows    Possible Skin Injury No    Pictures Uploaded Into Epic N/A  Wound Consult Placed N/A  RN Wound Prevention Protocol Ordered No

## 2024-08-29 NOTE — PROGRESS NOTES
I was called to discuss this patients care with Dr. Lazaro Olivo. We discussed abnormal EKG.    This is a 23 year old woman with no prior cardiac history and intermittent cocaine use who presented with syncope. EKG shows sinus rhythm with frequent PACs. It would be reasonable to observe her on telemetry for any arrhythmia and be considered for event monitor outpatient, and obtain routine echocardiogram given history of drug use.     At this time it was deemed no formal in person cardiology consultation was necessary, however if this changes due to changes in patient condition or abnormal test results, please re-consult cardiology.    Electronically Signed by:    Yaritza Cavazos M.D.     8/28/2024    10:18 PM

## 2024-08-29 NOTE — ASSESSMENT & PLAN NOTE
Noted to have a syncopal episode.  In ER, noted to have several episodes sinus bradycardia into the 40s. Initial troponin negative and TSH within normal limits.  Doubt ACS.  Will order CT head as patient reports head strike as she syncopized.  Cardiology consulted, recommends monitoring telemetry overnight and echocardiogram in a.m.

## 2024-08-29 NOTE — CARE PLAN
Problem: Pain - Standard  Goal: Alleviation of pain or a reduction in pain to the patient’s comfort goal  Outcome: Progressing     Problem: Knowledge Deficit - Standard  Goal: Patient and family/care givers will demonstrate understanding of plan of care, disease process/condition, diagnostic tests and medications  Outcome: Progressing     Problem: Hemodynamics  Goal: Patient's hemodynamics, fluid balance and neurologic status will be stable or improve  Outcome: Progressing   The patient is Stable - Low risk of patient condition declining or worsening    Shift Goals  Clinical Goals: ECHO, Tele monitoring, Safety  Patient Goals: Rest  Family Goals: Rest    Progress made toward(s) clinical / shift goals:  VSS, A/O x4, Ambulating w/ steady gait.     Patient is not progressing towards the following goals:

## 2024-08-29 NOTE — DISCHARGE SUMMARY
Discharge Summary    CHIEF COMPLAINT ON ADMISSION  Chief Complaint   Patient presents with    Syncope     Pt had a witnessed syncopal event at work. Pt states they fell into chair.Pt states they have never had this happen before    Anxiety     Pt states they were having a conversation with coworker when they started to feel like they had a weight on chest and hyperventilating. Pt remembers collapsing and then being woken up by coworker.     Headache     Pt states they hit head on chair when collapsing. 7/10 pain currently       Reason for Admission  Syncope     Admission Date  8/28/2024    CODE STATUS  Full Code    HPI & HOSPITAL COURSE    Ms. Anais Larsen is a 23 y.o. female with a past medical history of syphilis, miscarriage, cocaine use, cannabis use, who presented 8/28/2024 with a syncopal event.      Patient was at work when she was engaging in a conversation with her colleague about a traumatic event.  This left patient to feel stressed out and anxious.  She felt chest pressure and was hyperventilating and suddenly syncopized.  She reports head strike on her way down.  She woke up on the floor.  She has never had a syncopal episode in the past.     At [facility] ED, patient found to have several bradycardia arrhythmias along with intermittent PVCs on monitor.  U tox positive for cocaine and cannabis.  Initial troponin negative and EKG showing sinus bradycardia.  Cardiology consulted, no acute intervention from their standpoint and recommended medical admission.    During his hospitalization, an echocardiogram was pursued which noted LVEF approximately 65% with no inferior vena cava size and inspiratory collapse.  Patient seen and examined prior to being discharged.  Discussed with patient imaging results which is negative for any acute pathologies.  Patient counseled and educated in regards to using street drugs such as cannabis and cocaine.  Patient to resume any other home medications at home if any.   Patient to follow-up with PCP for management of care.  All questions and concerns answered prior to being discharged.  Patient discharged home.    Therefore, she is discharged in good and stable condition to home with close outpatient follow-up.    The patient recovered much more quickly than anticipated on admission.    Discharge Date  08/29/24      FOLLOW UP ITEMS POST DISCHARGE  Please call [phone] to schedule PCP appointment for patient.    Required specialty appointments include:       Discharge Instructions per [facility]    -Follow-up with PCP s/p hospitalization  -Stop using cocaine and cannabis  -Keep yourself hydrated  -Continue any home medications if any    DIET: As tolerated    ACTIVITY: As tolerated    DIAGNOSIS: Syncope    Return to ER if symptoms persist, chest pain, palpitations, shortness of breath, numbness, tingling, weakness, and high fevers.    DISCHARGE DIAGNOSES  Principal Problem (Resolved):    Syncope (POA: Yes)  Active Problems:    Cocaine abuse (HCC) (POA: Yes)  Resolved Problems:    Syncope and collapse (POA: Yes)      FOLLOW UP  No future appointments.  No follow-up provider specified.    MEDICATIONS ON DISCHARGE     Medication List        CONTINUE taking these medications        Instructions   ibuprofen 200 MG Tabs  Commonly known as: Motrin   Take 600 mg by mouth every 6 hours as needed for Mild Pain. 3 tablets= 600mg  Dose: 600 mg              Allergies  Allergies   Allergen Reactions    Penicillin G Rash     rash    Sulfa Drugs Rash     Rash         DIET  Orders Placed This Encounter   Procedures    Diet Order Diet: Cardiac     Standing Status:   Standing     Number of Occurrences:   1     Order Specific Question:   Diet:     Answer:   Cardiac [6]       ACTIVITY  As tolerated.  Weight bearing as tolerated    CONSULTATIONS  NONE    PROCEDURES  NONE    IMAGING    EC-ECHOCARDIOGRAM COMPLETE W/O CONT   Final Result      CT-HEAD W/O   Final Result          1.  No acute intracranial abnormality.               DX-CHEST-PORTABLE (1 VIEW)   Final Result         1.  No acute cardiopulmonary disease.            LABORATORY  Lab Results   Component Value Date    SODIUM 137 08/28/2024    POTASSIUM 4.1 08/28/2024    CHLORIDE 104 08/28/2024    CO2 21 08/28/2024    GLUCOSE 96 08/28/2024    BUN 19 08/28/2024    CREATININE 0.69 08/28/2024        Lab Results   Component Value Date    WBC 10.8 08/28/2024    HEMOGLOBIN 15.3 08/28/2024    HEMATOCRIT 46.0 08/28/2024    PLATELETCT 287 08/28/2024

## 2024-08-29 NOTE — PROGRESS NOTES
" Daily Progress Note    Date of Service  8/29/2024    Chief Complaint  Anais Larsen is a 23 y.o. female admitted 8/28/2024 with syncope     Hospital Course  Ms. Larsen is a 23 y.o female with no medical history she was brought into the ER after feeling a hot flash, dizziness and passing out. Friends reported that she was \"out\" for 1 min with no movement. Once she awoke she was nauseated and had a headache due to her hitting her head.     In the ER she was found to be bradycardic with intermittent PVCs per ECG. Her Drug tox was positive for cocaine and cannabis. Labs were insignificant. She was admitted for continued assessment of bradycardia which is a change from her July 2024 ECG.     Patient complains of nausea and continued hot flashes at any time, there is no event or activity that makes these symptoms better or worse. She denies any chest pain, shortness of breath, dizziness, or syncope.         Interval Problem Update  Patient was seen today feeling well except for the occasional hot flash accompanied by nausea. She does admit to use of cocaine and cannabis. She believes that she had a \"panic attack\". Troponin <6. Due to the recent changes in her EKG and syncope episode she will have an ECHO. Possible discharge home pending ECHO.      I have discussed this patient's plan of care and discharge plan at IDT rounds today with Case Management, Nursing, Nursing leadership, and other members of the IDT team.    Consultants/Specialty  {Other providers:[id number]}    Code Status  Full Code    Disposition  <MEDICALLYCLEARED>  I have placed the appropriate orders for post-discharge needs.    Review of Systems  Review of Systems   Constitutional:  Negative for chills, diaphoresis, fever and malaise/fatigue.   HENT: Negative.     Eyes: Negative.    Respiratory: Negative.  Negative for sputum production and shortness of breath.    Cardiovascular: Negative.    Gastrointestinal:  Positive for nausea. Negative for abdominal " pain, diarrhea and vomiting.   Genitourinary: Negative.    Musculoskeletal: Negative.    Skin: Negative.    Neurological: Negative.    Psychiatric/Behavioral:  The patient is nervous/anxious.         Physical Exam      Physical Exam  Constitutional:       Appearance: Normal appearance. She is normal weight.   HENT:      Head: Normocephalic.      Nose: Nose normal.      Mouth/Throat:      Mouth: Mucous membranes are dry.      Pharynx: Oropharynx is clear.   Eyes:      Extraocular Movements: Extraocular movements intact.      Pupils: Pupils are equal, round, and reactive to light.   Cardiovascular:      Rate and Rhythm: Bradycardia present.      Pulses: Normal pulses.   Pulmonary:      Effort: Pulmonary effort is normal.      Breath sounds: Normal breath sounds.   Abdominal:      General: Bowel sounds are normal.      Palpations: Abdomen is soft.   Musculoskeletal:         General: Normal range of motion.      Cervical back: Normal range of motion.   Skin:     General: Skin is warm and dry.      Capillary Refill: Capillary refill takes 2 to 3 seconds.   Neurological:      General: No focal deficit present.      Mental Status: She is alert.         Fluids    Intake/Output Summary (Last 24 hours) at 8/29/2024 [facility]  Last data filed at 8/28/2024 2121  Gross per 24 hour   Intake 1000 ml   Output --   Net 1000 ml        Laboratory  Recent Labs     08/28/24 2018   WBC 10.8   RBC 4.88   HEMOGLOBIN 15.3   HEMATOCRIT 46.0   MCV 94.3   MCH 31.4   MCHC 33.3   RDW 45.5   PLATELETCT 287   MPV 10.2     Recent Labs     08/28/24 2018   SODIUM 137   POTASSIUM 4.1   CHLORIDE 104   CO2 21   GLUCOSE 96   BUN 19   CREATININE 0.69   CALCIUM 10.0                   Imaging  EC-ECHOCARDIOGRAM COMPLETE W/O CONT         CT-HEAD W/O   Final Result         1.  No acute intracranial abnormality.               DX-CHEST-PORTABLE (1 VIEW)   Final Result         1.  No acute cardiopulmonary disease.           Assessment/Plan  Syncope  Patient had a  syncope episode that lasted 1 min, EKG changes showing bradycardia with PVCs, hot flashes and nausea telemerty showing rate 51.   - ECHO  - LR bolus   -Zofran 4 mg IV prn nausea Q6H  - telemetry monitoring  -stat troponin and EKG for chest pain  -   Anxiety complaints of feeling anxious and having panic attacks and having a stressful situation   - behavioral resources     Substance abuse positive for cocaine and cannabis   - behavioral resources and abstain from drug use      VTE prophylaxis: VTE Selection    I have performed a physical exam and reviewed and updated ROS and Plan today (8/29/2024). In review of yesterday's note (8/28/2024), there are no changes except as documented above.

## 2024-08-29 NOTE — ED NOTES
Pt aox4, skin pink warm and dry, airway patent, rr even and unlabored. Vitals stable. Pt in NAD at this time with no new complaints. Transports with RN on cardiac monitor in stable condition.

## 2024-12-13 NOTE — ED NOTES
Pt medicated as ordered, pt updated. She is aware of urine sample needed    Refill for one month pended.  Last seen 11/21/22. Message to patient sent indicating needs to schedule annual exam to continue to get refills.  Stuart Aquino 12/13/2024 12:29 PM

## 2025-01-23 ENCOUNTER — HOSPITAL ENCOUNTER (EMERGENCY)
Facility: MEDICAL CENTER | Age: 25
End: 2025-01-23
Attending: EMERGENCY MEDICINE

## 2025-01-23 VITALS
RESPIRATION RATE: 16 BRPM | BODY MASS INDEX: 30.91 KG/M2 | SYSTOLIC BLOOD PRESSURE: 125 MMHG | TEMPERATURE: 97 F | HEART RATE: 75 BPM | OXYGEN SATURATION: 95 % | WEIGHT: 167.99 LBS | DIASTOLIC BLOOD PRESSURE: 75 MMHG | HEIGHT: 62 IN

## 2025-01-23 DIAGNOSIS — L30.9 DERMATITIS: ICD-10-CM

## 2025-01-23 PROCEDURE — 99282 EMERGENCY DEPT VISIT SF MDM: CPT

## 2025-01-23 ASSESSMENT — FIBROSIS 4 INDEX: FIB4 SCORE: 0.5

## 2025-01-23 NOTE — ED PROVIDER NOTES
"ED Provider Note    CHIEF COMPLAINT  Chief Complaint   Patient presents with    Rash     Patient reports rash x 4 hours  on neck after splashing degrease spray on her neck at work while using it.      Medical Clearance     Patient requesting doctors note to return to work.        HPI/EVERETT    Anais Larsen is a 24 y.o. female who presents with irritation to her chest.  The patient states that she splashed some decreasing spray on her neck and anterior chest.  She has some pruritus and slight discomfort.  She states she needs a note to return to work.  She has not have any difficulty with breathing or swallowing.    PAST MEDICAL HISTORY   has a past medical history of Miscarriage and Syphilis.    SURGICAL HISTORY  patient denies any surgical history    FAMILY HISTORY  Family History   Problem Relation Age of Onset    Hypertension Father     Hyperlipidemia Father     Heart Disease Paternal Grandfather     Cancer Other         breast Ca       SOCIAL HISTORY  Social History     Tobacco Use    Smoking status: Never    Smokeless tobacco: Never   Vaping Use    Vaping status: Never Used   Substance and Sexual Activity    Alcohol use: Not Currently     Alcohol/week: 2.4 oz     Types: 4 Standard drinks or equivalent per week     Comment: once weekly    Drug use: Yes     Frequency: 7.0 times per week     Types: Inhaled, Marijuana     Comment: Marijuana daily    Sexual activity: Yes     Partners: Male     Comment: contraception        CURRENT MEDICATIONS  Home Medications       Reviewed by Belgica Gallego R.N. (Registered Nurse) on 01/23/25 at 1047  Med List Status: Partial     Medication Last Dose Status   ibuprofen (MOTRIN) 200 MG Tab  Active                    ALLERGIES  Allergies   Allergen Reactions    Penicillin G Rash     rash    Sulfa Drugs Rash     Rash         PHYSICAL EXAM  VITAL SIGNS: BP (!) 129/97   Pulse 77   Temp 35.8 °C (96.5 °F) (Temporal)   Resp 16   Ht 1.575 m (5' 2\")   Wt 76.2 kg (167 lb 15.9 oz)   " LMP 01/20/2025   SpO2 99%   BMI 30.73 kg/m²    In general the patient does not appear toxic    Oral cavity no uvular edema nor erythema    Pulmonary the patient's lungs are clear to auscultation bilaterally    Skin the patient does have some slight dermatitis to the anterior neck and chest      COURSE & MEDICAL DECISION MAKING    This is a 24-year-old female who presents with a suspected contact dermatitis or chemical dermatitis to the anterior chest.  She does not have any evidence of a systemic reaction.  The patient be treated supportively with Benadryl as needed for itching and Motrin as needed for discomfort.  She will go home and completely decontaminate.  The patient will be cleared for work.    FINAL DIAGNOSIS  Dermatitis to the neck and chest    Disposition  The patient will be discharged in stable condition     Electronically signed by: Nash Chapman M.D., 1/23/2025 11:22 AM

## 2025-01-23 NOTE — ED NOTES
All discharge instructions given to pt.  Pt advised not to drink or drive after taking narcotic pain medications.   Pt verbalized understanding of all discharge instructions.  All questions answered. All personal belongings sent with pt. Pt ambulatory to [email or web address].

## 2025-01-23 NOTE — ED TRIAGE NOTES
Chief Complaint   Patient presents with    Rash     Patient reports rash x 4 hours  on neck after splashing degrease spray on her neck at work while using it.      Medical Clearance     Patient requesting doctors note to return to work.

## 2025-01-23 NOTE — DISCHARGE INSTRUCTIONS
Take Benadryl as needed for itching.  Take Motrin or Tylenol as needed for discomfort.  Go home and take a shower to completely decontaminate.

## 2025-05-06 ENCOUNTER — APPOINTMENT (OUTPATIENT)
Dept: RADIOLOGY | Facility: MEDICAL CENTER | Age: 25
End: 2025-05-06
Attending: STUDENT IN AN ORGANIZED HEALTH CARE EDUCATION/TRAINING PROGRAM

## 2025-05-06 ENCOUNTER — HOSPITAL ENCOUNTER (EMERGENCY)
Facility: MEDICAL CENTER | Age: 25
End: 2025-05-06
Attending: STUDENT IN AN ORGANIZED HEALTH CARE EDUCATION/TRAINING PROGRAM

## 2025-05-06 VITALS
RESPIRATION RATE: 16 BRPM | TEMPERATURE: 97.7 F | HEART RATE: 75 BPM | DIASTOLIC BLOOD PRESSURE: 64 MMHG | SYSTOLIC BLOOD PRESSURE: 139 MMHG | OXYGEN SATURATION: 95 % | HEIGHT: 63 IN | WEIGHT: 169.75 LBS | BODY MASS INDEX: 30.08 KG/M2

## 2025-05-06 DIAGNOSIS — R11.2 NAUSEA AND VOMITING, UNSPECIFIED VOMITING TYPE: ICD-10-CM

## 2025-05-06 DIAGNOSIS — E86.0 DEHYDRATION: ICD-10-CM

## 2025-05-06 DIAGNOSIS — R10.84 GENERALIZED ABDOMINAL PAIN: ICD-10-CM

## 2025-05-06 DIAGNOSIS — R19.7 DIARRHEA, UNSPECIFIED TYPE: ICD-10-CM

## 2025-05-06 LAB
ALBUMIN SERPL BCP-MCNC: 4.8 G/DL (ref 3.2–4.9)
ALBUMIN/GLOB SERPL: 1.3 G/DL
ALP SERPL-CCNC: 111 U/L (ref 30–99)
ALT SERPL-CCNC: 13 U/L (ref 2–50)
ANION GAP SERPL CALC-SCNC: 13 MMOL/L (ref 7–16)
APPEARANCE UR: CLEAR
AST SERPL-CCNC: 23 U/L (ref 12–45)
BACTERIA #/AREA URNS HPF: ABNORMAL /HPF
BASOPHILS # BLD AUTO: 0.7 % (ref 0–1.8)
BASOPHILS # BLD: 0.1 K/UL (ref 0–0.12)
BILIRUB SERPL-MCNC: 0.4 MG/DL (ref 0.1–1.5)
BILIRUB UR QL STRIP.AUTO: NEGATIVE
BUN SERPL-MCNC: 12 MG/DL (ref 8–22)
CALCIUM ALBUM COR SERPL-MCNC: 9.6 MG/DL (ref 8.5–10.5)
CALCIUM SERPL-MCNC: 10.2 MG/DL (ref 8.5–10.5)
CASTS URNS QL MICRO: ABNORMAL /LPF (ref 0–2)
CHLORIDE SERPL-SCNC: 107 MMOL/L (ref 96–112)
CO2 SERPL-SCNC: 20 MMOL/L (ref 20–33)
COLOR UR: YELLOW
CREAT SERPL-MCNC: 0.74 MG/DL (ref 0.5–1.4)
EOSINOPHIL # BLD AUTO: 0.07 K/UL (ref 0–0.51)
EOSINOPHIL NFR BLD: 0.5 % (ref 0–6.9)
EPITHELIAL CELLS 1715: ABNORMAL /HPF (ref 0–5)
ERYTHROCYTE [DISTWIDTH] IN BLOOD BY AUTOMATED COUNT: 43.1 FL (ref 35.9–50)
GFR SERPLBLD CREATININE-BSD FMLA CKD-EPI: 114 ML/MIN/1.73 M 2
GLOBULIN SER CALC-MCNC: 3.6 G/DL (ref 1.9–3.5)
GLUCOSE SERPL-MCNC: 117 MG/DL (ref 65–99)
GLUCOSE UR STRIP.AUTO-MCNC: NEGATIVE MG/DL
HCG SERPL QL: NEGATIVE
HCT VFR BLD AUTO: 47.7 % (ref 37–47)
HGB BLD-MCNC: 16.6 G/DL (ref 12–16)
IMM GRANULOCYTES # BLD AUTO: 0.05 K/UL (ref 0–0.11)
IMM GRANULOCYTES NFR BLD AUTO: 0.3 % (ref 0–0.9)
KETONES UR STRIP.AUTO-MCNC: ABNORMAL MG/DL
LEUKOCYTE ESTERASE UR QL STRIP.AUTO: NEGATIVE
LIPASE SERPL-CCNC: 23 U/L (ref 11–82)
LYMPHOCYTES # BLD AUTO: 1.64 K/UL (ref 1–4.8)
LYMPHOCYTES NFR BLD: 10.8 % (ref 22–41)
MCH RBC QN AUTO: 31.4 PG (ref 27–33)
MCHC RBC AUTO-ENTMCNC: 34.8 G/DL (ref 32.2–35.5)
MCV RBC AUTO: 90.2 FL (ref 81.4–97.8)
MICRO URNS: ABNORMAL
MONOCYTES # BLD AUTO: 0.52 K/UL (ref 0–0.85)
MONOCYTES NFR BLD AUTO: 3.4 % (ref 0–13.4)
NEUTROPHILS # BLD AUTO: 12.84 K/UL (ref 1.82–7.42)
NEUTROPHILS NFR BLD: 84.3 % (ref 44–72)
NITRITE UR QL STRIP.AUTO: NEGATIVE
NRBC # BLD AUTO: 0 K/UL
NRBC BLD-RTO: 0 /100 WBC (ref 0–0.2)
PH UR STRIP.AUTO: >=9 [PH] (ref 5–8)
PLATELET # BLD AUTO: 348 K/UL (ref 164–446)
PMV BLD AUTO: 10 FL (ref 9–12.9)
POTASSIUM SERPL-SCNC: 4.2 MMOL/L (ref 3.6–5.5)
PROT SERPL-MCNC: 8.4 G/DL (ref 6–8.2)
PROT UR QL STRIP: 100 MG/DL
RBC # BLD AUTO: 5.29 M/UL (ref 4.2–5.4)
RBC # URNS HPF: ABNORMAL /HPF (ref 0–2)
RBC UR QL AUTO: NEGATIVE
SODIUM SERPL-SCNC: 140 MMOL/L (ref 135–145)
SP GR UR STRIP.AUTO: 1.02
UROBILINOGEN UR STRIP.AUTO-MCNC: 0.2 EU/DL
WBC # BLD AUTO: 15.2 K/UL (ref 4.8–10.8)
WBC #/AREA URNS HPF: ABNORMAL /HPF

## 2025-05-06 PROCEDURE — 80053 COMPREHEN METABOLIC PANEL: CPT

## 2025-05-06 PROCEDURE — 96374 THER/PROPH/DIAG INJ IV PUSH: CPT

## 2025-05-06 PROCEDURE — 96375 TX/PRO/DX INJ NEW DRUG ADDON: CPT

## 2025-05-06 PROCEDURE — 85025 COMPLETE CBC W/AUTO DIFF WBC: CPT

## 2025-05-06 PROCEDURE — 74177 CT ABD & PELVIS W/CONTRAST: CPT

## 2025-05-06 PROCEDURE — 84703 CHORIONIC GONADOTROPIN ASSAY: CPT

## 2025-05-06 PROCEDURE — 99285 EMERGENCY DEPT VISIT HI MDM: CPT

## 2025-05-06 PROCEDURE — 96361 HYDRATE IV INFUSION ADD-ON: CPT

## 2025-05-06 PROCEDURE — 81001 URINALYSIS AUTO W/SCOPE: CPT

## 2025-05-06 PROCEDURE — 83690 ASSAY OF LIPASE: CPT

## 2025-05-06 PROCEDURE — 700105 HCHG RX REV CODE 258: Performed by: STUDENT IN AN ORGANIZED HEALTH CARE EDUCATION/TRAINING PROGRAM

## 2025-05-06 PROCEDURE — 700117 HCHG RX CONTRAST REV CODE 255: Performed by: STUDENT IN AN ORGANIZED HEALTH CARE EDUCATION/TRAINING PROGRAM

## 2025-05-06 PROCEDURE — 700111 HCHG RX REV CODE 636 W/ 250 OVERRIDE (IP): Mod: JZ | Performed by: STUDENT IN AN ORGANIZED HEALTH CARE EDUCATION/TRAINING PROGRAM

## 2025-05-06 PROCEDURE — 36415 COLL VENOUS BLD VENIPUNCTURE: CPT

## 2025-05-06 RX ORDER — SODIUM CHLORIDE 9 MG/ML
1000 INJECTION, SOLUTION INTRAVENOUS ONCE
Status: COMPLETED | OUTPATIENT
Start: 2025-05-06 | End: 2025-05-06

## 2025-05-06 RX ORDER — HALOPERIDOL 5 MG/ML
2.5 INJECTION INTRAMUSCULAR ONCE
Status: COMPLETED | OUTPATIENT
Start: 2025-05-06 | End: 2025-05-06

## 2025-05-06 RX ORDER — DIPHENHYDRAMINE HYDROCHLORIDE 50 MG/ML
25 INJECTION, SOLUTION INTRAMUSCULAR; INTRAVENOUS ONCE
Status: COMPLETED | OUTPATIENT
Start: 2025-05-06 | End: 2025-05-06

## 2025-05-06 RX ORDER — ONDANSETRON 4 MG/1
4 TABLET, ORALLY DISINTEGRATING ORAL EVERY 6 HOURS PRN
Qty: 10 TABLET | Refills: 0 | Status: SHIPPED | OUTPATIENT
Start: 2025-05-06

## 2025-05-06 RX ADMIN — HALOPERIDOL LACTATE 2.5 MG: 5 INJECTION, SOLUTION INTRAMUSCULAR at 11:07

## 2025-05-06 RX ADMIN — IOHEXOL 96 ML: 350 INJECTION, SOLUTION INTRAVENOUS at 11:45

## 2025-05-06 RX ADMIN — DIPHENHYDRAMINE HYDROCHLORIDE 25 MG: 50 INJECTION, SOLUTION INTRAMUSCULAR; INTRAVENOUS at 11:03

## 2025-05-06 RX ADMIN — SODIUM CHLORIDE 1000 ML: 9 INJECTION, SOLUTION INTRAVENOUS at 11:02

## 2025-05-06 ASSESSMENT — FIBROSIS 4 INDEX: FIB4 SCORE: 0.5

## 2025-05-06 NOTE — ED TRIAGE NOTES
"Chief Complaint   Patient presents with    Abdominal Pain     Pt report abdominal pain, 10/10 started today.     Nausea/Vomiting/Diarrhea     Pt report unable to keep anything down.        Pt ambulatory to triage. Pt A&Ox4, for above complaint.     Pt to lobby. Pt educated on alerting staff in changes to condition. Pt verbalized understanding. Protocol initiated.     BP 98/73   Pulse 63   Temp 36.5 °C (97.7 °F) (Temporal)   Resp 18   Ht 1.6 m (5' 3\")   Wt 77 kg (169 lb 12.1 oz)   SpO2 99%   BMI 30.07 kg/m²    "

## 2025-05-06 NOTE — ED NOTES
Patient back from CT. Patient reports pain and nausea has improved after the medications. Declined further needs at this time.

## 2025-05-06 NOTE — ED PROVIDER NOTES
CHIEF COMPLAINT  Chief Complaint   Patient presents with    Abdominal Pain     Pt report abdominal pain, 10/10 started today.     Nausea/Vomiting/Diarrhea     Pt report unable to keep anything down.        LIMITATION TO HISTORY   Select: None    BALDEMAR Larsen is a 24 y.o. female who presents to the Emergency Department presents for evaluation of nausea vomiting which started around 4 AM today with associated right-sided lower abdominal pain.  Patient states she had several episodes of nonbloody nonbilious vomiting did have an episode of loose watery stool.  No recent sick contacts or travel.  No frankly bloody stools melena, hematemesis fevers.  Patient did state after the vomiting she began to develop a sharp pain in her right lower abdomen.  Denies any prior abdominal surgeries.  Does marijuana daily  OUTSIDE HISTORIAN(S):  Select: None    EXTERNAL RECORDS REVIEWED  Select: Other admission note 2024 was admitted for syncope      PAST MEDICAL HISTORY  Past Medical History:   Diagnosis Date    Miscarriage     Syphilis      .    SURGICAL HISTORY  History reviewed. No pertinent surgical history.      FAMILY HISTORY  Family History   Problem Relation Age of Onset    Hypertension Father     Hyperlipidemia Father     Heart Disease Paternal Grandfather     Cancer Other         breast Ca          SOCIAL HISTORY  Social History     Socioeconomic History    Marital status: Single     Spouse name: Not on file    Number of children: Not on file    Years of education: Not on file    Highest education level: Not on file   Occupational History    Not on file   Tobacco Use    Smoking status: Never    Smokeless tobacco: Never   Vaping Use    Vaping status: Never Used   Substance and Sexual Activity    Alcohol use: Not Currently     Alcohol/week: 2.4 oz     Types: 4 Standard drinks or equivalent per week     Comment: once weekly    Drug use: Yes     Frequency: 7.0 times per week     Types: Inhaled, Marijuana     Comment:  "Marijuana daily    Sexual activity: Yes     Partners: Male     Comment: contraception    Other Topics Concern    Not on file   Social History Narrative    Not on file     Social Drivers of Health     Financial Resource Strain: Not on file   Food Insecurity: No Food Insecurity (8/29/2024)    Hunger Vital Sign     Worried About Running Out of Food in the Last Year: Never true     Ran Out of Food in the Last Year: Never true   Transportation Needs: No Transportation Needs (8/29/2024)    PRAPARE - Transportation     Lack of Transportation (Medical): No     Lack of Transportation (Non-Medical): No   Physical Activity: Not on file   Stress: Not on file   Social Connections: Not on file   Intimate Partner Violence: Not At Risk (8/29/2024)    Humiliation, Afraid, Rape, and Kick questionnaire     Fear of Current or Ex-Partner: No     Emotionally Abused: No     Physically Abused: No     Sexually Abused: No   Housing Stability: Low Risk  (8/29/2024)    Housing Stability Vital Sign     Unable to Pay for Housing in the Last Year: No     Number of Times Moved in the Last Year: 1     Homeless in the Last Year: No         CURRENT MEDICATIONS  No current facility-administered medications on file prior to encounter.     Current Outpatient Medications on File Prior to Encounter   Medication Sig Dispense Refill    ibuprofen (MOTRIN) 200 MG Tab Take 600 mg by mouth every 6 hours as needed for Mild Pain. 3 tablets= 600mg             ALLERGIES  Allergies   Allergen Reactions    Penicillin G Rash     rash    Sulfa Drugs Rash     Rash         PHYSICAL EXAM  VITAL SIGNS:/58   Pulse 78   Temp 36.5 °C (97.7 °F) (Temporal)   Resp 16   Ht 1.6 m (5' 3\")   Wt 77 kg (169 lb 12.1 oz)   SpO2 94%   BMI 30.07 kg/m²       VITALS - vital signs documented prior to this note have been reviewed and noted,  GENERAL - awake, alert, oriented, GCS 15, no apparent distress, non-toxic  appearing  HEENT - normocephalic, atraumatic, pupils equal, " sclera anicteric, mucus  membranes moist  NECK - supple, no meningismus, full active range of motion, trachea midline  CARDIOVASCULAR - regular rate/rhythm, no murmurs/gallops/rubs  PULMONARY - no respiratory distress, speaking in full sentences, clear to  auscultation bilaterally, no wheezing/ronchi/rales, no accessory muscle use  GASTROINTESTINAL -mild diffuse tenderness  GENITOURINARY - Deferred  NEUROLOGIC - Awake alert, normal mental status, speech fluid, cognition  normal, moves all extremities  MUSCULOSKELETAL - no obvious asymmetry or deformities present  EXTREMITIES - warm, well-perfused, no cyanosis or significant edema  DERMATOLOGIC - warm, dry, no rashes, no jaundice  PSYCHIATRIC - normal affect, normal insight, normal concentration    DIAGNOSTIC STUDIES / PROCEDURES      LABS  Labs Reviewed   CBC WITH DIFFERENTIAL - Abnormal; Notable for the following components:       Result Value    WBC 15.2 (*)     Hemoglobin 16.6 (*)     Hematocrit 47.7 (*)     Neutrophils-Polys 84.30 (*)     Lymphocytes 10.80 (*)     Neutrophils (Absolute) 12.84 (*)     All other components within normal limits   COMP METABOLIC PANEL - Abnormal; Notable for the following components:    Glucose 117 (*)     Alkaline Phosphatase 111 (*)     Total Protein 8.4 (*)     Globulin 3.6 (*)     All other components within normal limits   URINALYSIS,CULTURE IF INDICATED - Abnormal; Notable for the following components:    Ph >=9.0 (*)     Ketones Trace (*)     Protein 100 (*)     All other components within normal limits   URINE MICROSCOPIC (W/UA) - Abnormal; Notable for the following components:    Bacteria Few (*)     Epithelial Cells 11-20 (*)     All other components within normal limits   LIPASE   HCG QUAL SERUM   ESTIMATED GFR       Leukocytosis and elevated hemoglobin may be reflect hemoconcentration in setting of vomiting or stress margination from patient's acute nausea vomiting  RADIOLOGY  I have independently interpreted the  diagnostic imaging associated with this visit and am waiting the final reading from the radiologist.   My preliminary interpretation is as follows: No obvious small bowel obstruction      Radiologist interpretation:   CT-ABDOMEN-PELVIS WITH   Final Result      Mild bowel wall thickening in the right side of the colon suspicious for colitis.              COURSE & MEDICAL DECISION MAKING    ED COURSE:        INTERVENTIONS BY ME:  Medications   haloperidol lactate (Haldol) injection 2.5 mg (2.5 mg Intravenous Given 5/6/25 1107)   diphenhydrAMINE (Benadryl) injection 25 mg (25 mg Intravenous Given 5/6/25 1103)   NS (Bolus) 0.9 % infusion 1,000 mL (0 mL Intravenous Stopped 5/6/25 1202)   iohexol (OMNIPAQUE) 350 mg/mL (IV) (96 mL Intravenous Given 5/6/25 1145)       Response on recheck: On reevaluation patient reports feeling better is now tolerating p.o.        Hydration: Based on the patient's presentation of Abnormal Fluid Loss and Acute Vomiting the patient was given IV fluids. IV Hydration was used because oral hydration was not adequate alone. Upon recheck following hydration, the patient was improved.     INITIAL ASSESSMENT, COURSE AND PLAN  DDx appendicitis, pyelonephritis, urinary tract infection colitis enteritis gastroenteritis foodborne illness viral illness dehydration electrolyte abnormality among other considerations.    Care Narrative: Patient presented for evaluation nausea vomiting diarrhea with associated right lower quadrant abdominal pain which started today.  On examination does have a mild diffuse tenderness which seems worse in the right lower quadrant.  Does report daily marijuana use.  She was treated with Haldol Benadryl with resolution of her vomiting.  Labs were obtained did show leukocytosis which I believe is a stress demargination from the patient's nausea vomiting.  Urinalysis did have a few bacteria and 3-5 WBCs and no urinary symptoms doubt UTI.  Did obtain CT abdomen pelvis which did  not show any evidence of appendicitis did appear to be a mild colitis given the patient's nausea vomiting, could certainly be viral in etiology.  No black tarry stools or bloody stools.  After Haldol Benadryl patient was able to tolerate p.o. repeat abdominal exam was improved, and at this point I do believe the patient is appropriate for outpatient management return precautions and follow-up were discussed she was discharged in stable condition.           ADDITIONAL PROBLEM LIST    DISPOSITION AND DISCUSSIONS    Barriers to care at this time, including but not limited to: Patient does not have established PCP.     Decision tools and prescription drugs considered including, but not limited to:  Zofran .    FINAL DIAGNOSIS  1. Nausea and vomiting, unspecified vomiting type    2. Dehydration    3. Generalized abdominal pain    4. Diarrhea, unspecified type             Electronically signed by: Surya Wood DO ,12:02 PM 05/06/25

## 2025-08-30 ENCOUNTER — APPOINTMENT (OUTPATIENT)
Dept: RADIOLOGY | Facility: MEDICAL CENTER | Age: 25
End: 2025-08-30
Attending: STUDENT IN AN ORGANIZED HEALTH CARE EDUCATION/TRAINING PROGRAM

## 2025-08-30 ENCOUNTER — HOSPITAL ENCOUNTER (EMERGENCY)
Facility: MEDICAL CENTER | Age: 25
End: 2025-08-30
Attending: STUDENT IN AN ORGANIZED HEALTH CARE EDUCATION/TRAINING PROGRAM

## 2025-08-30 VITALS
DIASTOLIC BLOOD PRESSURE: 88 MMHG | HEIGHT: 63 IN | SYSTOLIC BLOOD PRESSURE: 144 MMHG | WEIGHT: 175.49 LBS | HEART RATE: 99 BPM | OXYGEN SATURATION: 99 % | BODY MASS INDEX: 31.09 KG/M2 | RESPIRATION RATE: 18 BRPM | TEMPERATURE: 98.1 F

## 2025-08-30 DIAGNOSIS — R11.2 NAUSEA AND VOMITING, UNSPECIFIED VOMITING TYPE: ICD-10-CM

## 2025-08-30 DIAGNOSIS — Z53.21 ELOPED FROM EMERGENCY DEPARTMENT: ICD-10-CM

## 2025-08-30 DIAGNOSIS — E86.0 DEHYDRATION: Primary | ICD-10-CM

## 2025-08-30 DIAGNOSIS — R10.13 EPIGASTRIC ABDOMINAL PAIN: ICD-10-CM

## 2025-08-30 LAB
ALBUMIN SERPL BCP-MCNC: 4.7 G/DL (ref 3.2–4.9)
ALBUMIN/GLOB SERPL: 1.3 G/DL
ALP SERPL-CCNC: 137 U/L (ref 30–99)
ALT SERPL-CCNC: 17 U/L (ref 2–50)
ANION GAP SERPL CALC-SCNC: 17 MMOL/L (ref 7–16)
AST SERPL-CCNC: 22 U/L (ref 12–45)
BASOPHILS # BLD AUTO: 0.4 % (ref 0–1.8)
BASOPHILS # BLD: 0.07 K/UL (ref 0–0.12)
BILIRUB SERPL-MCNC: 0.4 MG/DL (ref 0.1–1.5)
BUN SERPL-MCNC: 10 MG/DL (ref 8–22)
CALCIUM ALBUM COR SERPL-MCNC: 9.6 MG/DL (ref 8.5–10.5)
CALCIUM SERPL-MCNC: 10.2 MG/DL (ref 8.5–10.5)
CHLORIDE SERPL-SCNC: 107 MMOL/L (ref 96–112)
CO2 SERPL-SCNC: 17 MMOL/L (ref 20–33)
CREAT SERPL-MCNC: 0.66 MG/DL (ref 0.5–1.4)
EOSINOPHIL # BLD AUTO: 0.03 K/UL (ref 0–0.51)
EOSINOPHIL NFR BLD: 0.2 % (ref 0–6.9)
ERYTHROCYTE [DISTWIDTH] IN BLOOD BY AUTOMATED COUNT: 44.9 FL (ref 35.9–50)
GFR SERPLBLD CREATININE-BSD FMLA CKD-EPI: 123 ML/MIN/1.73 M 2
GLOBULIN SER CALC-MCNC: 3.7 G/DL (ref 1.9–3.5)
GLUCOSE SERPL-MCNC: 130 MG/DL (ref 65–99)
HCG SERPL QL: NEGATIVE
HCT VFR BLD AUTO: 47.7 % (ref 37–47)
HGB BLD-MCNC: 16.2 G/DL (ref 12–16)
IMM GRANULOCYTES # BLD AUTO: 0.08 K/UL (ref 0–0.11)
IMM GRANULOCYTES NFR BLD AUTO: 0.4 % (ref 0–0.9)
LIPASE SERPL-CCNC: 18 U/L (ref 11–82)
LYMPHOCYTES # BLD AUTO: 1.53 K/UL (ref 1–4.8)
LYMPHOCYTES NFR BLD: 8.5 % (ref 22–41)
MCH RBC QN AUTO: 30.9 PG (ref 27–33)
MCHC RBC AUTO-ENTMCNC: 34 G/DL (ref 32.2–35.5)
MCV RBC AUTO: 91 FL (ref 81.4–97.8)
MONOCYTES # BLD AUTO: 0.62 K/UL (ref 0–0.85)
MONOCYTES NFR BLD AUTO: 3.4 % (ref 0–13.4)
NEUTROPHILS # BLD AUTO: 15.69 K/UL (ref 1.82–7.42)
NEUTROPHILS NFR BLD: 87.1 % (ref 44–72)
NRBC # BLD AUTO: 0 K/UL
NRBC BLD-RTO: 0 /100 WBC (ref 0–0.2)
PLATELET # BLD AUTO: 332 K/UL (ref 164–446)
PMV BLD AUTO: 9.9 FL (ref 9–12.9)
POTASSIUM SERPL-SCNC: 4.1 MMOL/L (ref 3.6–5.5)
PROT SERPL-MCNC: 8.4 G/DL (ref 6–8.2)
RBC # BLD AUTO: 5.24 M/UL (ref 4.2–5.4)
SODIUM SERPL-SCNC: 141 MMOL/L (ref 135–145)
WBC # BLD AUTO: 18 K/UL (ref 4.8–10.8)

## 2025-08-30 PROCEDURE — 84703 CHORIONIC GONADOTROPIN ASSAY: CPT

## 2025-08-30 PROCEDURE — 83690 ASSAY OF LIPASE: CPT

## 2025-08-30 PROCEDURE — 80053 COMPREHEN METABOLIC PANEL: CPT

## 2025-08-30 PROCEDURE — 700111 HCHG RX REV CODE 636 W/ 250 OVERRIDE (IP)

## 2025-08-30 PROCEDURE — 85025 COMPLETE CBC W/AUTO DIFF WBC: CPT

## 2025-08-30 RX ORDER — SODIUM CHLORIDE 9 MG/ML
1000 INJECTION, SOLUTION INTRAVENOUS ONCE
Status: DISCONTINUED | OUTPATIENT
Start: 2025-08-30 | End: 2025-08-30 | Stop reason: HOSPADM

## 2025-08-30 RX ORDER — DIPHENHYDRAMINE HYDROCHLORIDE 50 MG/ML
25 INJECTION, SOLUTION INTRAMUSCULAR; INTRAVENOUS ONCE
Status: DISCONTINUED | OUTPATIENT
Start: 2025-08-30 | End: 2025-08-30 | Stop reason: HOSPADM

## 2025-08-30 RX ORDER — ONDANSETRON 4 MG/1
4 TABLET, ORALLY DISINTEGRATING ORAL ONCE
Status: COMPLETED | OUTPATIENT
Start: 2025-08-30 | End: 2025-08-30

## 2025-08-30 RX ORDER — HALOPERIDOL 5 MG/ML
5 INJECTION INTRAMUSCULAR ONCE
Status: DISCONTINUED | OUTPATIENT
Start: 2025-08-30 | End: 2025-08-30 | Stop reason: HOSPADM

## 2025-08-30 RX ADMIN — ONDANSETRON 4 MG: 4 TABLET, ORALLY DISINTEGRATING ORAL at 13:21

## 2025-08-30 ASSESSMENT — FIBROSIS 4 INDEX: FIB4 SCORE: 0.48

## 2025-08-30 ASSESSMENT — PAIN DESCRIPTION - PAIN TYPE: TYPE: ACUTE PAIN
